# Patient Record
Sex: MALE | Race: WHITE | Employment: OTHER | URBAN - METROPOLITAN AREA
[De-identification: names, ages, dates, MRNs, and addresses within clinical notes are randomized per-mention and may not be internally consistent; named-entity substitution may affect disease eponyms.]

---

## 2020-04-28 ENCOUNTER — HOSPITAL ENCOUNTER (INPATIENT)
Age: 70
LOS: 1 days | Discharge: HOME | DRG: 192 | End: 2020-04-29
Attending: EMERGENCY MEDICINE | Admitting: INTERNAL MEDICINE
Payer: MEDICARE

## 2020-04-28 ENCOUNTER — APPOINTMENT (OUTPATIENT)
Dept: GENERAL RADIOLOGY | Age: 70
DRG: 192 | End: 2020-04-28
Payer: MEDICARE

## 2020-04-28 PROBLEM — J44.1 COPD EXACERBATION (HCC): Status: ACTIVE | Noted: 2020-04-28

## 2020-04-28 LAB
ALBUMIN SERPL-MCNC: 4.9 G/DL (ref 3.4–5)
ALP BLD-CCNC: 101 U/L (ref 40–129)
ALT SERPL-CCNC: 26 U/L (ref 10–40)
ANION GAP SERPL CALCULATED.3IONS-SCNC: 13 MMOL/L (ref 3–16)
AST SERPL-CCNC: 26 U/L (ref 15–37)
BASE EXCESS VENOUS: -0.2 MMOL/L (ref -2–3)
BASE EXCESS VENOUS: -0.5 MMOL/L (ref -2–3)
BASOPHILS ABSOLUTE: 0 K/UL (ref 0–0.2)
BASOPHILS RELATIVE PERCENT: 0.6 %
BILIRUB SERPL-MCNC: 0.3 MG/DL (ref 0–1)
BILIRUBIN DIRECT: <0.2 MG/DL (ref 0–0.3)
BILIRUBIN, INDIRECT: NORMAL MG/DL (ref 0–1)
BUN BLDV-MCNC: 19 MG/DL (ref 7–20)
C-REACTIVE PROTEIN: 2 MG/L (ref 0–5.1)
CALCIUM SERPL-MCNC: 9.8 MG/DL (ref 8.3–10.6)
CARBOXYHEMOGLOBIN: 1.1 % (ref 0–1.5)
CARBOXYHEMOGLOBIN: 1.5 % (ref 0–1.5)
CHLORIDE BLD-SCNC: 101 MMOL/L (ref 99–110)
CO2: 26 MMOL/L (ref 21–32)
CREAT SERPL-MCNC: 1.2 MG/DL (ref 0.8–1.3)
EKG ATRIAL RATE: 87 BPM
EKG DIAGNOSIS: NORMAL
EKG P AXIS: 52 DEGREES
EKG P-R INTERVAL: 172 MS
EKG Q-T INTERVAL: 404 MS
EKG QRS DURATION: 118 MS
EKG QTC CALCULATION (BAZETT): 526 MS
EKG R AXIS: -79 DEGREES
EKG T AXIS: 67 DEGREES
EKG VENTRICULAR RATE: 102 BPM
EOSINOPHILS ABSOLUTE: 0.2 K/UL (ref 0–0.6)
EOSINOPHILS RELATIVE PERCENT: 3.1 %
FERRITIN: 179.2 NG/ML (ref 30–400)
GFR AFRICAN AMERICAN: >60
GFR NON-AFRICAN AMERICAN: >60
GLUCOSE BLD-MCNC: 134 MG/DL (ref 70–99)
GLUCOSE BLD-MCNC: 169 MG/DL (ref 70–99)
GLUCOSE BLD-MCNC: 98 MG/DL (ref 70–99)
HCO3 VENOUS: 26.8 MMOL/L (ref 24–28)
HCO3 VENOUS: 27.3 MMOL/L (ref 24–28)
HCT VFR BLD CALC: 42.1 % (ref 40.5–52.5)
HEMOGLOBIN, VEN, REDUCED: 31.9 %
HEMOGLOBIN, VEN, REDUCED: 61.4 %
HEMOGLOBIN: 13.9 G/DL (ref 13.5–17.5)
LACTATE DEHYDROGENASE: 190 U/L (ref 100–190)
LACTIC ACID: 1.8 MMOL/L (ref 0.4–2)
LIPASE: 108 U/L (ref 13–60)
LYMPHOCYTES ABSOLUTE: 1.9 K/UL (ref 1–5.1)
LYMPHOCYTES RELATIVE PERCENT: 29.9 %
MCH RBC QN AUTO: 30.1 PG (ref 26–34)
MCHC RBC AUTO-ENTMCNC: 33.1 G/DL (ref 31–36)
MCV RBC AUTO: 91.1 FL (ref 80–100)
METHEMOGLOBIN VENOUS: 0.6 % (ref 0–1.5)
METHEMOGLOBIN VENOUS: 0.6 % (ref 0–1.5)
MONOCYTES ABSOLUTE: 0.4 K/UL (ref 0–1.3)
MONOCYTES RELATIVE PERCENT: 6 %
NEUTROPHILS ABSOLUTE: 3.9 K/UL (ref 1.7–7.7)
NEUTROPHILS RELATIVE PERCENT: 60.4 %
O2 SAT, VEN: 38 %
O2 SAT, VEN: 67 %
PCO2, VEN: 56.6 MMHG (ref 41–51)
PCO2, VEN: 61.4 MMHG (ref 41–51)
PDW BLD-RTO: 13.9 % (ref 12.4–15.4)
PERFORMED ON: ABNORMAL
PERFORMED ON: ABNORMAL
PH VENOUS: 7.27 (ref 7.35–7.45)
PH VENOUS: 7.3 (ref 7.35–7.45)
PLATELET # BLD: 197 K/UL (ref 135–450)
PMV BLD AUTO: 9 FL (ref 5–10.5)
PO2, VEN: 29.3 MMHG (ref 25–40)
PO2, VEN: 42.9 MMHG (ref 25–40)
POTASSIUM REFLEX MAGNESIUM: 4.7 MMOL/L (ref 3.5–5.1)
PRO-BNP: 79 PG/ML (ref 0–124)
PROCALCITONIN: 0.1 NG/ML (ref 0–0.15)
RAPID INFLUENZA  B AGN: NEGATIVE
RAPID INFLUENZA A AGN: NEGATIVE
RBC # BLD: 4.62 M/UL (ref 4.2–5.9)
SARS-COV-2, PCR: NOT DETECTED
SODIUM BLD-SCNC: 140 MMOL/L (ref 136–145)
TCO2 CALC VENOUS: 29 MMOL/L
TCO2 CALC VENOUS: 29 MMOL/L
TOTAL PROTEIN: 7.4 G/DL (ref 6.4–8.2)
TROPONIN: <0.01 NG/ML
WBC # BLD: 6.5 K/UL (ref 4–11)

## 2020-04-28 PROCEDURE — 6370000000 HC RX 637 (ALT 250 FOR IP): Performed by: INTERNAL MEDICINE

## 2020-04-28 PROCEDURE — 82728 ASSAY OF FERRITIN: CPT

## 2020-04-28 PROCEDURE — 0100U HC RESPIRPTHGN MULT REV TRANS & AMP PRB TECH 21 TRGT: CPT

## 2020-04-28 PROCEDURE — 85025 COMPLETE CBC W/AUTO DIFF WBC: CPT

## 2020-04-28 PROCEDURE — 71046 X-RAY EXAM CHEST 2 VIEWS: CPT

## 2020-04-28 PROCEDURE — 6370000000 HC RX 637 (ALT 250 FOR IP): Performed by: EMERGENCY MEDICINE

## 2020-04-28 PROCEDURE — 6360000002 HC RX W HCPCS

## 2020-04-28 PROCEDURE — 94669 MECHANICAL CHEST WALL OSCILL: CPT

## 2020-04-28 PROCEDURE — 96372 THER/PROPH/DIAG INJ SC/IM: CPT

## 2020-04-28 PROCEDURE — 83605 ASSAY OF LACTIC ACID: CPT

## 2020-04-28 PROCEDURE — 96374 THER/PROPH/DIAG INJ IV PUSH: CPT

## 2020-04-28 PROCEDURE — 84145 PROCALCITONIN (PCT): CPT

## 2020-04-28 PROCEDURE — 80048 BASIC METABOLIC PNL TOTAL CA: CPT

## 2020-04-28 PROCEDURE — 83615 LACTATE (LD) (LDH) ENZYME: CPT

## 2020-04-28 PROCEDURE — 96375 TX/PRO/DX INJ NEW DRUG ADDON: CPT

## 2020-04-28 PROCEDURE — 84484 ASSAY OF TROPONIN QUANT: CPT

## 2020-04-28 PROCEDURE — 94640 AIRWAY INHALATION TREATMENT: CPT

## 2020-04-28 PROCEDURE — 94150 VITAL CAPACITY TEST: CPT

## 2020-04-28 PROCEDURE — 87449 NOS EACH ORGANISM AG IA: CPT

## 2020-04-28 PROCEDURE — 80076 HEPATIC FUNCTION PANEL: CPT

## 2020-04-28 PROCEDURE — 86140 C-REACTIVE PROTEIN: CPT

## 2020-04-28 PROCEDURE — 2580000003 HC RX 258: Performed by: INTERNAL MEDICINE

## 2020-04-28 PROCEDURE — U0003 INFECTIOUS AGENT DETECTION BY NUCLEIC ACID (DNA OR RNA); SEVERE ACUTE RESPIRATORY SYNDROME CORONAVIRUS 2 (SARS-COV-2) (CORONAVIRUS DISEASE [COVID-19]), AMPLIFIED PROBE TECHNIQUE, MAKING USE OF HIGH THROUGHPUT TECHNOLOGIES AS DESCRIBED BY CMS-2020-01-R: HCPCS

## 2020-04-28 PROCEDURE — 87804 INFLUENZA ASSAY W/OPTIC: CPT

## 2020-04-28 PROCEDURE — 83880 ASSAY OF NATRIURETIC PEPTIDE: CPT

## 2020-04-28 PROCEDURE — 6360000002 HC RX W HCPCS: Performed by: EMERGENCY MEDICINE

## 2020-04-28 PROCEDURE — 6360000002 HC RX W HCPCS: Performed by: INTERNAL MEDICINE

## 2020-04-28 PROCEDURE — 2060000000 HC ICU INTERMEDIATE R&B

## 2020-04-28 PROCEDURE — 36415 COLL VENOUS BLD VENIPUNCTURE: CPT

## 2020-04-28 PROCEDURE — 89220 SPUTUM SPECIMEN COLLECTION: CPT

## 2020-04-28 PROCEDURE — 99285 EMERGENCY DEPT VISIT HI MDM: CPT

## 2020-04-28 PROCEDURE — 82803 BLOOD GASES ANY COMBINATION: CPT

## 2020-04-28 PROCEDURE — 83690 ASSAY OF LIPASE: CPT

## 2020-04-28 PROCEDURE — 93005 ELECTROCARDIOGRAM TRACING: CPT | Performed by: EMERGENCY MEDICINE

## 2020-04-28 RX ORDER — ALBUTEROL SULFATE 2.5 MG/3ML
SOLUTION RESPIRATORY (INHALATION)
Status: COMPLETED
Start: 2020-04-28 | End: 2020-04-28

## 2020-04-28 RX ORDER — ALBUTEROL SULFATE 2.5 MG/3ML
2.5 SOLUTION RESPIRATORY (INHALATION) ONCE
Status: COMPLETED | OUTPATIENT
Start: 2020-04-28 | End: 2020-04-28

## 2020-04-28 RX ORDER — NICOTINE POLACRILEX 4 MG
15 LOZENGE BUCCAL PRN
Status: DISCONTINUED | OUTPATIENT
Start: 2020-04-28 | End: 2020-04-29 | Stop reason: HOSPADM

## 2020-04-28 RX ORDER — METHYLPREDNISOLONE SODIUM SUCCINATE 40 MG/ML
40 INJECTION, POWDER, LYOPHILIZED, FOR SOLUTION INTRAMUSCULAR; INTRAVENOUS ONCE
Status: COMPLETED | OUTPATIENT
Start: 2020-04-28 | End: 2020-04-28

## 2020-04-28 RX ORDER — DEXTROSE MONOHYDRATE 25 G/50ML
12.5 INJECTION, SOLUTION INTRAVENOUS PRN
Status: DISCONTINUED | OUTPATIENT
Start: 2020-04-28 | End: 2020-04-29 | Stop reason: HOSPADM

## 2020-04-28 RX ORDER — PREDNISONE 20 MG/1
40 TABLET ORAL DAILY
Status: DISCONTINUED | OUTPATIENT
Start: 2020-04-28 | End: 2020-04-29 | Stop reason: HOSPADM

## 2020-04-28 RX ORDER — ALBUTEROL SULFATE 90 UG/1
2 AEROSOL, METERED RESPIRATORY (INHALATION) EVERY 6 HOURS PRN
COMMUNITY

## 2020-04-28 RX ORDER — IPRATROPIUM BROMIDE AND ALBUTEROL SULFATE 2.5; .5 MG/3ML; MG/3ML
1 SOLUTION RESPIRATORY (INHALATION) EVERY 4 HOURS
Status: DISCONTINUED | OUTPATIENT
Start: 2020-04-28 | End: 2020-04-29

## 2020-04-28 RX ORDER — BUDESONIDE AND FORMOTEROL FUMARATE DIHYDRATE 160; 4.5 UG/1; UG/1
2 AEROSOL RESPIRATORY (INHALATION) 2 TIMES DAILY
Status: DISCONTINUED | OUTPATIENT
Start: 2020-04-28 | End: 2020-04-29 | Stop reason: HOSPADM

## 2020-04-28 RX ORDER — FUROSEMIDE 10 MG/ML
20 INJECTION INTRAMUSCULAR; INTRAVENOUS ONCE
Status: COMPLETED | OUTPATIENT
Start: 2020-04-28 | End: 2020-04-28

## 2020-04-28 RX ORDER — PANTOPRAZOLE SODIUM 40 MG/1
40 TABLET, DELAYED RELEASE ORAL
Status: DISCONTINUED | OUTPATIENT
Start: 2020-04-28 | End: 2020-04-29 | Stop reason: HOSPADM

## 2020-04-28 RX ORDER — AMLODIPINE BESYLATE 5 MG/1
5 TABLET ORAL DAILY
Status: DISCONTINUED | OUTPATIENT
Start: 2020-04-28 | End: 2020-04-29

## 2020-04-28 RX ORDER — ALBUTEROL SULFATE 0.63 MG/3ML
1 SOLUTION RESPIRATORY (INHALATION) EVERY 6 HOURS PRN
Status: ON HOLD | COMMUNITY
End: 2020-04-28

## 2020-04-28 RX ORDER — INSULIN LISPRO 100 [IU]/ML
0-6 INJECTION, SOLUTION INTRAVENOUS; SUBCUTANEOUS
Status: DISCONTINUED | OUTPATIENT
Start: 2020-04-28 | End: 2020-04-29 | Stop reason: HOSPADM

## 2020-04-28 RX ORDER — METHYLPREDNISOLONE SODIUM SUCCINATE 40 MG/ML
40 INJECTION, POWDER, LYOPHILIZED, FOR SOLUTION INTRAMUSCULAR; INTRAVENOUS EVERY 12 HOURS
Status: DISCONTINUED | OUTPATIENT
Start: 2020-04-28 | End: 2020-04-28

## 2020-04-28 RX ORDER — POLYETHYLENE GLYCOL 3350 17 G/17G
17 POWDER, FOR SOLUTION ORAL DAILY PRN
Status: DISCONTINUED | OUTPATIENT
Start: 2020-04-28 | End: 2020-04-29 | Stop reason: HOSPADM

## 2020-04-28 RX ORDER — LISINOPRIL AND HYDROCHLOROTHIAZIDE 20; 12.5 MG/1; MG/1
1 TABLET ORAL 2 TIMES DAILY
Status: DISCONTINUED | OUTPATIENT
Start: 2020-04-28 | End: 2020-04-29

## 2020-04-28 RX ORDER — AMLODIPINE BESYLATE 5 MG/1
5 TABLET ORAL DAILY
COMMUNITY
End: 2021-12-14 | Stop reason: SINTOL

## 2020-04-28 RX ORDER — GUAIFENESIN 600 MG/1
600 TABLET, EXTENDED RELEASE ORAL 2 TIMES DAILY
Status: DISCONTINUED | OUTPATIENT
Start: 2020-04-28 | End: 2020-04-29 | Stop reason: HOSPADM

## 2020-04-28 RX ORDER — ALBUTEROL SULFATE 90 UG/1
2 AEROSOL, METERED RESPIRATORY (INHALATION) EVERY 6 HOURS PRN
Status: DISCONTINUED | OUTPATIENT
Start: 2020-04-28 | End: 2020-04-29

## 2020-04-28 RX ORDER — IPRATROPIUM BROMIDE AND ALBUTEROL SULFATE 2.5; .5 MG/3ML; MG/3ML
1 SOLUTION RESPIRATORY (INHALATION) ONCE
Status: COMPLETED | OUTPATIENT
Start: 2020-04-28 | End: 2020-04-28

## 2020-04-28 RX ORDER — ACETAMINOPHEN 650 MG/1
650 SUPPOSITORY RECTAL EVERY 6 HOURS PRN
Status: DISCONTINUED | OUTPATIENT
Start: 2020-04-28 | End: 2020-04-29 | Stop reason: HOSPADM

## 2020-04-28 RX ORDER — ACETAMINOPHEN 325 MG/1
650 TABLET ORAL EVERY 6 HOURS PRN
Status: DISCONTINUED | OUTPATIENT
Start: 2020-04-28 | End: 2020-04-29 | Stop reason: HOSPADM

## 2020-04-28 RX ORDER — SODIUM CHLORIDE 0.9 % (FLUSH) 0.9 %
10 SYRINGE (ML) INJECTION PRN
Status: DISCONTINUED | OUTPATIENT
Start: 2020-04-28 | End: 2020-04-29 | Stop reason: HOSPADM

## 2020-04-28 RX ORDER — SODIUM CHLORIDE 0.9 % (FLUSH) 0.9 %
10 SYRINGE (ML) INJECTION EVERY 12 HOURS SCHEDULED
Status: DISCONTINUED | OUTPATIENT
Start: 2020-04-28 | End: 2020-04-29 | Stop reason: HOSPADM

## 2020-04-28 RX ORDER — IPRATROPIUM BROMIDE AND ALBUTEROL SULFATE 2.5; .5 MG/3ML; MG/3ML
1 SOLUTION RESPIRATORY (INHALATION)
Status: DISCONTINUED | OUTPATIENT
Start: 2020-04-28 | End: 2020-04-28

## 2020-04-28 RX ORDER — LISINOPRIL AND HYDROCHLOROTHIAZIDE 20; 12.5 MG/1; MG/1
1 TABLET ORAL 2 TIMES DAILY
COMMUNITY
End: 2021-12-14

## 2020-04-28 RX ORDER — ONDANSETRON 2 MG/ML
4 INJECTION INTRAMUSCULAR; INTRAVENOUS EVERY 6 HOURS PRN
Status: DISCONTINUED | OUTPATIENT
Start: 2020-04-28 | End: 2020-04-29 | Stop reason: HOSPADM

## 2020-04-28 RX ORDER — LISINOPRIL 10 MG/1
12.5 TABLET ORAL 2 TIMES DAILY
Status: ON HOLD | COMMUNITY
End: 2020-04-28

## 2020-04-28 RX ORDER — PROMETHAZINE HYDROCHLORIDE 25 MG/1
12.5 TABLET ORAL EVERY 6 HOURS PRN
Status: DISCONTINUED | OUTPATIENT
Start: 2020-04-28 | End: 2020-04-29 | Stop reason: HOSPADM

## 2020-04-28 RX ORDER — DEXTROSE MONOHYDRATE 50 MG/ML
100 INJECTION, SOLUTION INTRAVENOUS PRN
Status: DISCONTINUED | OUTPATIENT
Start: 2020-04-28 | End: 2020-04-29 | Stop reason: HOSPADM

## 2020-04-28 RX ADMIN — GUAIFENESIN 600 MG: 600 TABLET, EXTENDED RELEASE ORAL at 19:31

## 2020-04-28 RX ADMIN — METHYLPREDNISOLONE SODIUM SUCCINATE 40 MG: 40 INJECTION, POWDER, FOR SOLUTION INTRAMUSCULAR; INTRAVENOUS at 06:27

## 2020-04-28 RX ADMIN — Medication 10 ML: at 19:31

## 2020-04-28 RX ADMIN — ALBUTEROL SULFATE 2.5 MG: 2.5 SOLUTION RESPIRATORY (INHALATION) at 09:59

## 2020-04-28 RX ADMIN — INSULIN LISPRO 1 UNITS: 100 INJECTION, SOLUTION INTRAVENOUS; SUBCUTANEOUS at 13:29

## 2020-04-28 RX ADMIN — AMLODIPINE BESYLATE 5 MG: 5 TABLET ORAL at 21:04

## 2020-04-28 RX ADMIN — IPRATROPIUM BROMIDE AND ALBUTEROL SULFATE 1 AMPULE: .5; 3 SOLUTION RESPIRATORY (INHALATION) at 15:00

## 2020-04-28 RX ADMIN — ALBUTEROL SULFATE 2.5 MG: 2.5 SOLUTION RESPIRATORY (INHALATION) at 09:58

## 2020-04-28 RX ADMIN — FUROSEMIDE 20 MG: 10 INJECTION, SOLUTION INTRAMUSCULAR; INTRAVENOUS at 16:33

## 2020-04-28 RX ADMIN — IPRATROPIUM BROMIDE AND ALBUTEROL SULFATE 1 AMPULE: .5; 3 SOLUTION RESPIRATORY (INHALATION) at 20:10

## 2020-04-28 RX ADMIN — IPRATROPIUM BROMIDE AND ALBUTEROL SULFATE 1 AMPULE: .5; 3 SOLUTION RESPIRATORY (INHALATION) at 06:15

## 2020-04-28 RX ADMIN — PREDNISONE 40 MG: 20 TABLET ORAL at 18:54

## 2020-04-28 RX ADMIN — ALBUTEROL SULFATE 2.5 MG: 2.5 SOLUTION RESPIRATORY (INHALATION) at 06:15

## 2020-04-28 RX ADMIN — LISINOPRIL AND HYDROCHLOROTHIAZIDE 1 TABLET: 12.5; 2 TABLET ORAL at 21:04

## 2020-04-28 RX ADMIN — ENOXAPARIN SODIUM 40 MG: 40 INJECTION SUBCUTANEOUS at 16:31

## 2020-04-28 RX ADMIN — PANTOPRAZOLE SODIUM 40 MG: 40 TABLET, DELAYED RELEASE ORAL at 13:29

## 2020-04-28 RX ADMIN — GUAIFENESIN 600 MG: 600 TABLET, EXTENDED RELEASE ORAL at 13:28

## 2020-04-28 RX ADMIN — BUDESONIDE AND FORMOTEROL FUMARATE DIHYDRATE 2 PUFF: 160; 4.5 AEROSOL RESPIRATORY (INHALATION) at 22:20

## 2020-04-28 SDOH — HEALTH STABILITY: MENTAL HEALTH: HOW OFTEN DO YOU HAVE A DRINK CONTAINING ALCOHOL?: NEVER

## 2020-04-28 ASSESSMENT — ENCOUNTER SYMPTOMS
VOMITING: 0
DIARRHEA: 1
EYE PAIN: 0
SHORTNESS OF BREATH: 1
COUGH: 0
NAUSEA: 0
WHEEZING: 0
ABDOMINAL PAIN: 0

## 2020-04-28 ASSESSMENT — PAIN SCALES - GENERAL: PAINLEVEL_OUTOF10: 0

## 2020-04-28 NOTE — H&P
Hospital Medicine History & Physical      PCP: Referring Not In System (Inactive)    Date of Admission: 4/28/2020    Date of Service: Pt seen/examined on 4/28/2020 and Admitted to Inpatient with expected LOS greater than two midnights due to medical therapy. Chief Complaint:  Shortness of breath, leg edema      History Of Present Illness: The patient is a 72 y.o. male with medical history of COPD and HTN, who presents to [facility] with worsening shortness of breath for the past few days. Patient reported initial dyspnea with laying down or exertion which progressed to resting dyspnea, prompting his admission. He denies any significant phlegm or cough. Has chronic cough but it is close to his baseline. Reports weight gain, abdominal distention and leg edema which is new for him. Subjective chills at home, but no documented temperature. No known sick contacts. Also reports some mid abdominal discomfort, nausea, few loose stools before admission, no emesis. ER course: patient was started on IV solumedrol and duonebs. CXR no infiltrate. Placed in COVID-19 rule out. He feels jittery on steroids. Past Medical History:        Diagnosis Date    COPD (chronic obstructive pulmonary disease) ([facility] [address]     Hypertension        Past Surgical History:    History reviewed. No pertinent surgical history. Medications Prior to Admission:    Prior to Admission medications    Not on File       Allergies:  Patient has no known allergies. Social History:  The patient currently lives at home    TOBACCO:   reports that he has quit smoking. He does not have any smokeless tobacco history on file. ETOH:   reports no history of alcohol use. Family History:  Reviewed in detail and negative for DM, Early CAD, Cancer, CVA. Positive as follows:    History reviewed. No pertinent family history. REVIEW OF SYSTEMS:   Positive and negative as noted in the HPI. All other systems reviewed and negative.     PHYSICAL EXAM:    BP (!) 142/80   Pulse 112   Temp 97.9 °F (36.6 °C) (Oral)   Resp 22   Ht 6' 2\" (1.88 m)   Wt 224 lb (101.6 kg)   SpO2 94%   BMI 28.76 kg/m²     General appearance: No apparent distress appears stated age and cooperative. HEENT Normal cephalic, atraumatic without obvious deformity. Conjunctivae/corneas clear. Neck: Supple, No jugular venous distention/bruits. Trachea midline without thyromegaly or adenopathy with full range of motion. Lungs: diminished without crackles or wheezing  Heart: Regular rate and rhythm with Normal S1/S2 without murmurs, rubs or gallops, point of maximum impulse non-displaced  Abdomen: Soft, non-tender or non-distended without rigidity or guarding and positive bowel sounds all four quadrants. Extremities: No clubbing, cyanosis, 1+ pitting leg edema bilaterally. Skin: Skin color, texture, turgor normal.    Neurologic: Alert and oriented X 3,  grossly non-focal.  Mental status: Alert, oriented, thought content appropriate. Capillary refill is brisk  Peripheral pulses 2+    CXR:  I have reviewed the CXR with the following interpretation: no infiltrates  EKG:  I have reviewed the EKG with the following interpretation: sinus with left axis and RBBB, no previous EKG available    CBC   Recent Labs     04/28/20  0556   WBC 6.5   HGB 13.9   HCT 42.1         RENAL  Recent Labs     04/28/20  0556      K 4.7      CO2 26   BUN 19   CREATININE 1.2     LFT'S  Recent Labs     04/28/20  0822   AST 26   ALT 26   BILIDIR <0.2   BILITOT 0.3   ALKPHOS 101     COAG  No results for input(s): INR in the last 72 hours.   CARDIAC ENZYMES  Recent Labs     04/28/20  0556   TROPONINI <0.01       U/A:  No results found for: NITRITE, COLORU, WBCUA, RBCUA, MUCUS, BACTERIA, CLARITYU, SPECGRAV, LEUKOCYTESUR, BLOODU, GLUCOSEU, AMORPHOUS    ABG  No results found for: WQF3YLQ, BEART, K1HPHEME, PHART, THGBART, KXH8GQR, PO2ART, XAI4FVL        Active Hospital Problems    Diagnosis Date

## 2020-04-28 NOTE — PROGRESS NOTES
4 Eyes Admission Assessment     I agree as the admission nurse that 2 RN's have performed a thorough Head to Toe Skin Assessment on the patient. ALL assessment sites listed below have been assessed on admission. Areas assessed by both nurses: ***  [x]   Head, Face, and Ears   [x]   Shoulders, Back, and Chest  [x]   Arms, Elbows, and Hands   [x]   Coccyx, Sacrum, and Ischum  [x]   Legs, Feet, and Heels        Does the Patient have Skin Breakdown?   No         Jace Prevention initiated:  No   Wound Care Orders initiated:  No      [facility] nurse consulted for Pressure Injury (Stage 3,4, Unstageable, DTI, NWPT, and Complex wounds):  No      Nurse 1 eSignature: Electronically signed by Archie Minor RN on 4/28/20 at 2:55 PM EDT    **SHARE this note so that the co-signing nurse is able to place an eSignature**    Nurse 2 eSignature: {Esignature:[id number]}

## 2020-04-28 NOTE — ED NOTES
Report given to [facility] on PCU. Pt will be transferred to PCU.       Leopoldo Castañeda RN  04/28/20 1047

## 2020-04-28 NOTE — CARE COORDINATION
Case Management Assessment           Initial Evaluation                Date / Time of Evaluation: 4/28/2020 10:56 AM                 Assessment Completed by: Aliza Costello    Patient Name: Griffin Brown     YOB: 1950  Diagnosis: COPD exacerbation ([facility] [address] [J44.1]  COPD exacerbation ([facility] [address] [J44.1]     Date / Time: 4/28/2020  5:36 AM    Patient Admission Status: Inpatient    If patient is discharged prior to next notation, then this note serves as note for discharge by case management. Current PCP: Referring Not In System (Inactive)  Clinic Patient: No    Chart Reviewed: Yes  Patient/ Family Interviewed: Yes    Initial assessment completed at bedside with: Patient    Hospitalization in the last 30 days: No    Emergency Contacts:  Extended Emergency Contact Information  Primary Emergency Contact: jaime agarwal Phone: [phone]  Relation: Child    Advance Directives:   Code Status: No Order    Healthcare Power of : No    Financial  Payor: /     Pre-cert required for SNF: No    Pharmacy  No Pharmacies Listed    Potential assistance Purchasing Medications:    Does Patient want to participate in local refill/ meds to beds program?:      Meds To Beds General Rules:  1. Can ONLY be done Monday- Friday between 8:30am-5pm  2. Prescription(s) must be in pharmacy by 3pm to be filled same day  3. Copy of patient's insurance/ prescription drug card and patient face sheet must be sent along with the prescription(s)  4. Cost of Rx cannot be added to hospital bill. If financial assistance is needed, please contact unit  or ;  or  CANNOT provide pharmacy voucher for patients co-pays  5.  Patients can then  the prescription on their way out of the hospital at discharge, or pharmacy can deliver to the bedside if staff is available. (payment due at time of pick-up or delivery - cash, check, or card accepted)     Able to afford home [phone]

## 2020-04-28 NOTE — PROGRESS NOTES
RESPIRATORY THERAPY ASSESSMENT    Name:  Jonnie Abel  Medical Record Number:  [id number]  Age: 72 y.o. Gender: male  : 1950  Today's Date:  2020  Room:  [facility]4322-01    Assessment     Is the patient being admitted for a COPD or Asthma exacerbation? Yes   (If yes the patient will be seen every 4 hours for the first 24 hours and then reassessed)    Patient Admission Diagnosis      Allergies  No Known Allergies    Minimum Predicted Vital Capacity:    1750          Actual Vital Capacity:      2250              Pulmonary History:COPD  Home Oxygen Therapy:  room air  Home Respiratory Therapy:None   Current Respiratory Therapy: HHN Duoneb Q4 hrs  Treatment Type: HHN, IS, Vibratory mucous clearing therapy or intervention  Medications: Albuterol/Ipratropium    Respiratory Severity Index(RSI)   Patients with orders for inhalation medications, oxygen, or any therapeutic treatment modality will be placed on Respiratory Protocol. They will be assessed with the first treatment and at least every 72 hours thereafter. The following severity scale will be used to determine frequency of treatment intervention. Smoking History: Mild Exacerbation = 3    Social History  Social History     Tobacco Use    Smoking status: Former Smoker   Substance Use Topics    Alcohol use: Never     Frequency: Never    Drug use: Never       Recent Surgical History: None = 0  Past Surgical History  History reviewed. No pertinent surgical history.     Level of Consciousness: Alert, Oriented, and Cooperative = 0    Level of Activity: Walking unassisted = 0    Respiratory Pattern: Increased; RR 21-30 = 1    Breath Sounds: Diminshed bilaterally and/or crackles = 2    Sputum   ,  , Sputum How Obtained: Spontaneous cough  Cough: Strong, spontaneous, non-productive = 0    Vital Signs   BP (!) 142/80   Pulse 112   Temp 97.9 °F (36.6 °C) (Oral)   Resp 22   Ht 6' 2\" (1.88 m)   Wt 224 lb (101.6 kg)   SpO2 94%   BMI 28.76 kg/m² SPO2 (COPD values may differ): Greater than or equal to 92% on room air = 0    Peak Flow (asthma only): not applicable = 0    RSI: 7-8 = BID and Q4HPRN (every four hours as needed) for dyspnea        Plan       Goals: medication delivery, mobilize retained secretions, volume expansion and improve oxygenation    Patient/caregiver was educated on the proper method of use for Respiratory Care Devices:  Yes      Level of patient/caregiver understanding able to:   ? Verbalize understanding   ? Demonstrate understanding       ? Teach back        ? Needs reinforcement       ? No available caregiver               ? Other:     Response to education:  Excellent     Is patient being placed on Home Treatment Regimen? No     Does the patient have everything they need prior to discharge? NA     Comments: Continue HHN Duoneb Q4hrs and prn. Plan of Care:     Electronically signed by Sherice Acosta RCP on 4/28/2020 at 3:16 PM    Respiratory Protocol Guidelines     1. Assessment and treatment by Respiratory Therapy will be initiated for medication and therapeutic interventions upon initiation of aerosolized medication. 2. Physician will be contacted for respiratory rate (RR) greater than 35 breaths per minute. Therapy will be held for heart rate (HR) greater than 140 beats per minute, pending direction from physician. 3. Bronchodilators will be administered via Metered Dose Inhaler (MDI) with spacer when the following criteria are met:  a. Alert and cooperative     b. HR < 140 bpm  c. RR < 30 bpm                d. Can demonstrate a 2-3 second inspiratory hold  4. Bronchodilators will be administered via Hand Held Nebulizer SHANTEL [facility]) to patients when ANY of the following criteria are met  a. Incognizant or uncooperative          b. Patients treated with HHN at Home        c. Unable to demonstrate proper use of MDI with spacer     d. RR > 30 bpm   5.  Bronchodilators will be delivered via Metered Dose Inhaler (MDI), HHN, Aerogen to intubated patients on mechanical ventilation. 6. Inhalation medication orders will be delivered and/or substituted as outlined below. Aerosolized Medications Ordering and Administration Guidelines:    1. All Medications will be ordered by a physician, and their frequency and/or modality will be adjusted as defined by the patients Respiratory Severity Index (RSI) score. 2. If the patient does not have documented COPD, consider discontinuing anticholinergics when RSI is less than 9.  3. If the bronchospasm worsens (increased RSI), then the bronchodilator frequency can be increased to a maximum of every 4 hours. If greater than every 4 hours is required, the physician will be contacted. 4. If the bronchospasm improves, the frequency of the bronchodilator can be decreased, based on the patient's RSI, but not less than home treatment regimen frequency. 5. Bronchodilator(s) will be discontinued if patient has a RSI less than 9 and has received no scheduled or as needed treatment for 72  Hrs. Patients Ordered on a Mucolytic Agent:    1. Must always be administered with a bronchodilator. 2. Discontinue if patient experiences worsened bronchospasm, or secretions have lessened to the point that the patient is able to clear them with a cough. Anti-inflammatory and Combination Medications:    1. If the patient lacks prior history of lung disease, is not using inhaled anti-inflammatory medication at home, and lacks wheezing by examination or by history for at least 24 hours, contact physician for possible discontinuation.

## 2020-04-28 NOTE — ED PROVIDER NOTES
[address] ENCOUNTER          ATTENDING PHYSICIAN NOTE       Date of evaluation: 4/28/2020    Chief Complaint     Shortness of Breath      History of Present Illness     Amari Lee is a 72 y.o. male who presents with a chief complaint shortness of breath. The patient has a history of COPD. States that he woke from sleep last night with a discomfort that he locates as being absent the xiphoid process and shortness of breath. He states that he went to the bathroom and had diarrhea and the discomfort got better but the shortness of breath persisted and so he called 911. He denies any cough. No chest pain at this time. No nausea or vomiting. Apparently he was 85% on room air for EMS which improved with supplemental oxygen. Review of Systems     Review of Systems   Constitutional: Negative for chills and fever. HENT: Negative for congestion. Eyes: Negative for pain. Respiratory: Positive for shortness of breath. Negative for cough and wheezing. Cardiovascular: Positive for chest pain. Negative for leg swelling. Gastrointestinal: Positive for diarrhea. Negative for abdominal pain, nausea and vomiting. Genitourinary: Negative for dysuria. Musculoskeletal: Negative for arthralgias. Skin: Negative for rash. Neurological: Negative for weakness and headaches. All other systems reviewed and are negative. Past Medical, Surgical, Family, and Social History         Diagnosis Date    COPD (chronic obstructive pulmonary disease) ([facility] [address]     Hypertension      History reviewed. No pertinent surgical history. His family history is not on file. He reports that he has quit smoking. He does not have any smokeless tobacco history on file. He reports that he does not drink alcohol or use drugs. Medications     Previous Medications    No medications on file       Allergies     He has No Known Allergies.     Physical Exam     ED Triage Vitals [04/28/20 0543]   Enc Vitals Group      BP (!) 131/98      Pulse 93      Resp 20      Temp 97.6 °F (36.4 °C)      Temp Source Oral      SpO2 100 %      Weight       Height       Head Circumference       Peak Flow       Pain Score       Pain Loc       Pain Edu? Excl. in [address]? General:  Non-toxic, no acute distress, fully cooperative with my exam    HEENT:  NCAT    Neck:  Supple    Pulmonary:   No increased work of breathing; faint scattered wheezes    Cardiac:  RRR, no murmur, thrill or gallop. Capillary refill <3s. 2+ distal pulses    Abdomen:  Soft, nontender, nondistended; no focal rebound or guarding    Musculoskeletal:  Grossly intact without obvious injury or deformity    Neuro:  No focal motor deficits    Skin: No rashes      Diagnostic Results     EKG   Sinus rhythm. Left axis deviation. Prolonged QT. No obvious ST or T wave changes. RADIOLOGY:  XR CHEST STANDARD (2 VW)   Final Result      Minimal bibasilar linear opacity, likely atelectasis or scarring.           LABS:   Results for orders placed or performed during the hospital encounter of 04/28/20   CBC Auto Differential   Result Value Ref Range    WBC 6.5 4.0 - 11.0 K/uL    RBC 4.62 4.20 - 5.90 M/uL    Hemoglobin 13.9 13.5 - 17.5 g/dL    Hematocrit 42.1 40.5 - 52.5 %    MCV 91.1 80.0 - 100.0 fL    MCH 30.1 26.0 - 34.0 pg    MCHC 33.1 31.0 - 36.0 g/dL    RDW 13.9 12.4 - 15.4 %    Platelets 802 468 - 669 K/uL    MPV 9.0 5.0 - 10.5 fL    Neutrophils % 60.4 %    Lymphocytes % 29.9 %    Monocytes % 6.0 %    Eosinophils % 3.1 %    Basophils % 0.6 %    Neutrophils Absolute 3.9 1.7 - 7.7 K/uL    Lymphocytes Absolute 1.9 1.0 - 5.1 K/uL    Monocytes Absolute 0.4 0.0 - 1.3 K/uL    Eosinophils Absolute 0.2 0.0 - 0.6 K/uL    Basophils Absolute 0.0 0.0 - 0.2 K/uL   EKG 12 Lead   Result Value Ref Range    Ventricular Rate 102 BPM    Atrial Rate 87 BPM    P-R Interval 172 ms    QRS Duration 118 ms    Q-T Interval 404 ms    QTc Calculation (Bazett) 526 ms    P Axis 52 degrees Carl Edmonds MD  04/28/20 5047

## 2020-04-29 VITALS
DIASTOLIC BLOOD PRESSURE: 73 MMHG | RESPIRATION RATE: 18 BRPM | HEIGHT: 74 IN | WEIGHT: 224 LBS | SYSTOLIC BLOOD PRESSURE: 126 MMHG | TEMPERATURE: 98 F | OXYGEN SATURATION: 93 % | HEART RATE: 104 BPM | BODY MASS INDEX: 28.75 KG/M2

## 2020-04-29 LAB
ALBUMIN SERPL-MCNC: 4.7 G/DL (ref 3.4–5)
ALP BLD-CCNC: 97 U/L (ref 40–129)
ALT SERPL-CCNC: 24 U/L (ref 10–40)
ANION GAP SERPL CALCULATED.3IONS-SCNC: 14 MMOL/L (ref 3–16)
AST SERPL-CCNC: 31 U/L (ref 15–37)
BILIRUB SERPL-MCNC: 0.4 MG/DL (ref 0–1)
BILIRUBIN DIRECT: <0.2 MG/DL (ref 0–0.3)
BILIRUBIN, INDIRECT: NORMAL MG/DL (ref 0–1)
BUN BLDV-MCNC: 23 MG/DL (ref 7–20)
CALCIUM SERPL-MCNC: 9.6 MG/DL (ref 8.3–10.6)
CHLORIDE BLD-SCNC: 98 MMOL/L (ref 99–110)
CO2: 23 MMOL/L (ref 21–32)
CREAT SERPL-MCNC: 1.3 MG/DL (ref 0.8–1.3)
D DIMER: <200 NG/ML DDU (ref 0–229)
GFR AFRICAN AMERICAN: >60
GFR NON-AFRICAN AMERICAN: 55
GLUCOSE BLD-MCNC: 112 MG/DL (ref 70–99)
GLUCOSE BLD-MCNC: 123 MG/DL (ref 70–99)
GLUCOSE BLD-MCNC: 133 MG/DL (ref 70–99)
GLUCOSE BLD-MCNC: 158 MG/DL (ref 70–99)
L. PNEUMOPHILA SEROGP 1 UR AG: NORMAL
LIPASE: 24 U/L (ref 13–60)
LV EF: 58 %
LVEF MODALITY: NORMAL
PERFORMED ON: ABNORMAL
POTASSIUM REFLEX MAGNESIUM: 4.8 MMOL/L (ref 3.5–5.1)
REPORT: NORMAL
RESPIRATORY PANEL PCR: NORMAL
SODIUM BLD-SCNC: 135 MMOL/L (ref 136–145)
STREP PNEUMONIAE ANTIGEN, URINE: NORMAL
TOTAL PROTEIN: 7 G/DL (ref 6.4–8.2)

## 2020-04-29 PROCEDURE — 83690 ASSAY OF LIPASE: CPT

## 2020-04-29 PROCEDURE — C8929 TTE W OR WO FOL WCON,DOPPLER: HCPCS

## 2020-04-29 PROCEDURE — 85379 FIBRIN DEGRADATION QUANT: CPT

## 2020-04-29 PROCEDURE — 36415 COLL VENOUS BLD VENIPUNCTURE: CPT

## 2020-04-29 PROCEDURE — 6360000002 HC RX W HCPCS: Performed by: INTERNAL MEDICINE

## 2020-04-29 PROCEDURE — 6370000000 HC RX 637 (ALT 250 FOR IP): Performed by: INTERNAL MEDICINE

## 2020-04-29 PROCEDURE — 80076 HEPATIC FUNCTION PANEL: CPT

## 2020-04-29 PROCEDURE — 94640 AIRWAY INHALATION TREATMENT: CPT

## 2020-04-29 PROCEDURE — 6360000004 HC RX CONTRAST MEDICATION: Performed by: INTERNAL MEDICINE

## 2020-04-29 PROCEDURE — 2580000003 HC RX 258: Performed by: INTERNAL MEDICINE

## 2020-04-29 PROCEDURE — 80048 BASIC METABOLIC PNL TOTAL CA: CPT

## 2020-04-29 PROCEDURE — 96372 THER/PROPH/DIAG INJ SC/IM: CPT

## 2020-04-29 PROCEDURE — 99223 1ST HOSP IP/OBS HIGH 75: CPT | Performed by: INTERNAL MEDICINE

## 2020-04-29 RX ORDER — PREDNISONE 20 MG/1
40 TABLET ORAL DAILY
Qty: 10 TABLET | Refills: 0 | Status: SHIPPED | OUTPATIENT
Start: 2020-04-30 | End: 2020-05-05

## 2020-04-29 RX ORDER — AMLODIPINE BESYLATE 5 MG/1
5 TABLET ORAL DAILY
Status: DISCONTINUED | OUTPATIENT
Start: 2020-04-29 | End: 2020-04-29 | Stop reason: HOSPADM

## 2020-04-29 RX ORDER — ATORVASTATIN CALCIUM 20 MG/1
20 TABLET, FILM COATED ORAL NIGHTLY
Status: DISCONTINUED | OUTPATIENT
Start: 2020-04-29 | End: 2020-04-29 | Stop reason: HOSPADM

## 2020-04-29 RX ORDER — ATORVASTATIN CALCIUM 20 MG/1
20 TABLET, FILM COATED ORAL NIGHTLY
Qty: 30 TABLET | Refills: 3 | Status: SHIPPED | OUTPATIENT
Start: 2020-04-29

## 2020-04-29 RX ORDER — IPRATROPIUM BROMIDE AND ALBUTEROL SULFATE 2.5; .5 MG/3ML; MG/3ML
1 SOLUTION RESPIRATORY (INHALATION)
Status: DISCONTINUED | OUTPATIENT
Start: 2020-04-29 | End: 2020-04-29 | Stop reason: HOSPADM

## 2020-04-29 RX ORDER — LISINOPRIL 5 MG/1
5 TABLET ORAL DAILY
Status: DISCONTINUED | OUTPATIENT
Start: 2020-04-29 | End: 2020-04-29 | Stop reason: HOSPADM

## 2020-04-29 RX ORDER — ALBUTEROL SULFATE 2.5 MG/3ML
2.5 SOLUTION RESPIRATORY (INHALATION) EVERY 6 HOURS PRN
Status: DISCONTINUED | OUTPATIENT
Start: 2020-04-29 | End: 2020-04-29 | Stop reason: HOSPADM

## 2020-04-29 RX ORDER — ALBUTEROL SULFATE 2.5 MG/3ML
2.5 SOLUTION RESPIRATORY (INHALATION) EVERY 6 HOURS PRN
Qty: 120 EACH | Refills: 3 | Status: SHIPPED | OUTPATIENT
Start: 2020-04-29

## 2020-04-29 RX ADMIN — BUDESONIDE AND FORMOTEROL FUMARATE DIHYDRATE 2 PUFF: 160; 4.5 AEROSOL RESPIRATORY (INHALATION) at 12:40

## 2020-04-29 RX ADMIN — PERFLUTREN 1.65 MG: 6.52 INJECTION, SUSPENSION INTRAVENOUS at 09:06

## 2020-04-29 RX ADMIN — AMLODIPINE BESYLATE 5 MG: 5 TABLET ORAL at 09:46

## 2020-04-29 RX ADMIN — IPRATROPIUM BROMIDE AND ALBUTEROL SULFATE 1 AMPULE: .5; 3 SOLUTION RESPIRATORY (INHALATION) at 12:39

## 2020-04-29 RX ADMIN — Medication 10 ML: at 09:47

## 2020-04-29 RX ADMIN — PREDNISONE 40 MG: 20 TABLET ORAL at 09:46

## 2020-04-29 RX ADMIN — IPRATROPIUM BROMIDE AND ALBUTEROL SULFATE 1 AMPULE: .5; 3 SOLUTION RESPIRATORY (INHALATION) at 00:20

## 2020-04-29 RX ADMIN — PANTOPRAZOLE SODIUM 40 MG: 40 TABLET, DELAYED RELEASE ORAL at 09:46

## 2020-04-29 RX ADMIN — IPRATROPIUM BROMIDE AND ALBUTEROL SULFATE 1 AMPULE: .5; 3 SOLUTION RESPIRATORY (INHALATION) at 04:55

## 2020-04-29 RX ADMIN — IPRATROPIUM BROMIDE AND ALBUTEROL SULFATE 1 AMPULE: .5; 3 SOLUTION RESPIRATORY (INHALATION) at 16:00

## 2020-04-29 RX ADMIN — GUAIFENESIN 600 MG: 600 TABLET, EXTENDED RELEASE ORAL at 09:46

## 2020-04-29 RX ADMIN — ENOXAPARIN SODIUM 40 MG: 40 INJECTION SUBCUTANEOUS at 09:46

## 2020-04-29 ASSESSMENT — PAIN SCALES - GENERAL
PAINLEVEL_OUTOF10: 0

## 2020-04-29 NOTE — PROGRESS NOTES
kg/m²   SPO2 (COPD values may differ): Greater than or equal to 92% on room air = 0    Peak Flow (asthma only): not applicable = 0    RSI: 0-4 = See once and convert to home regimen or discontinue        Plan       Goals: medication delivery    Patient/caregiver was educated on the proper method of use for Respiratory Care Devices:  Yes      Level of patient/caregiver understanding able to:   ? Verbalize understanding   ? Demonstrate understanding       ? Teach back        ? Needs reinforcement       ? No available caregiver               ? Other:     Response to education:  Good     Is patient being placed on Home Treatment Regimen? No     Does the patient have everything they need prior to discharge? NA     Comments: Pt assessed to q4hr prn, but leaving q4hr w/a    Plan of Care: Adrien Alcantar q4hr w/a    Electronically signed by Ryan Taylor RCP on 4/29/2020 at 2:32 PM    Respiratory Protocol Guidelines     1. Assessment and treatment by Respiratory Therapy will be initiated for medication and therapeutic interventions upon initiation of aerosolized medication. 2. Physician will be contacted for respiratory rate (RR) greater than 35 breaths per minute. Therapy will be held for heart rate (HR) greater than 140 beats per minute, pending direction from physician. 3. Bronchodilators will be administered via Metered Dose Inhaler (MDI) with spacer when the following criteria are met:  a. Alert and cooperative     b. HR < 140 bpm  c. RR < 30 bpm                d. Can demonstrate a 2-3 second inspiratory hold  4. Bronchodilators will be administered via Hand Held Nebulizer SHANTEL [facility]) to patients when ANY of the following criteria are met  a. Incognizant or uncooperative          b. Patients treated with HHN at Home        c. Unable to demonstrate proper use of MDI with spacer     d. RR > 30 bpm   5.  Bronchodilators will be delivered via Metered Dose Inhaler (MDI), HHN, Aerogen to intubated patients on mechanical No

## 2020-04-29 NOTE — PROGRESS NOTES
distress, appears stated age and cooperative. Lungs: diminished without wheezing or crackles  Heart: Regular rate and rhythm with Normal S1/S2 without  murmurs, rubs or gallops, point of maximum impulse non-displaced  Abdomen: Soft, non-tender or non-distended without rigidity or guarding and positive bowel sounds all four quadrants. Extremities: No clubbing, cyanosis, improved edema of LE bilaterally. Skin: Skin color, texture, turgor normal.    Neurologic: Alert and oriented X 3,  grossly non-focal.  Mental status: Alert, oriented, thought content appropriate. Data    Recent Labs     04/28/20  0556   WBC 6.5   HGB 13.9   HCT 42.1         Recent Labs     04/28/20  0556 04/29/20  0510    135*   K 4.7 4.8    98*   CO2 26 23   BUN 19 23*   CREATININE 1.2 1.3     Recent Labs     04/28/20  0822 04/29/20  0510   AST 26 31   ALT 26 24   BILIDIR <0.2 <0.2   BILITOT 0.3 0.4   ALKPHOS 101 97     No results for input(s): INR in the last 72 hours. Recent Labs     04/28/20  0556   TROPONINI <0.01       Consults:     IP CONSULT TO HOSPITALIST  IP CONSULT TO CARDIOLOGY    Active Hospital Problems    Diagnosis Date Noted    COPD exacerbation ([facility] [address] [J44.1] 04/28/2020         ASSESSMENT AND PLAN      Dyspnea:  COPD with exacerbation, cannot exclude CHF given leg edema and orthopnea. Continue with steroids and bronchodilators   Hold further lasix due to bump in creatinine and will ask cardiology to evaluate  Continue with ICS/LABA  Infectious work up- COVID-19, rapid flu, resp film panel- all negative  Given prolonged QT- holding off azithromycin   IS and acapella  ECHO - nl EF, with abnormal septal motion    Dyspepsia:  Mild, had mildly elevated lipase but otherwise benign abdomen. All liver enzymes are normal today- diet advanced. HTN:  Resume norvasc, hold lisinopril for now    No Acute Respiratory Failure     DVT Prophylaxis: lovenox  Diet: DIET CARDIAC;   Diet NPO, After Midnight  Code

## 2020-04-29 NOTE — CONSULTS
72 y.o. woman who came to hospital with sob and LE edema. Pt has had increasing sob over the last several days. Has had increased LE edema, weight gain, and abd distension. Had some chills at home. No obvious fevers but didn't check temp at home (no fevers here). He says this sob feeling is just like his \"usual\" copd exacerbations, and he already feels better with his treatment. He actually (despite his tachypnea) actually said he feels better than he did 1 mo ago, but he is more \"amped\" up because of the steroids. He has no angina. Never had an MI that he knows of. His sob laying flat is better than when he came in.    LE edema is down. Abd swelling seems to be better. Past Medical History:   Diagnosis Date    COPD (chronic obstructive pulmonary disease) ([facility] [address]     Hypertension      History reviewed. No pertinent surgical history. Social History     Tobacco Use    Smoking status: Former Smoker   Substance Use Topics    Alcohol use: Never     Frequency: Never    Drug use: Never     No Known Allergies    History reviewed. No pertinent family history. Review of Systems   General: No fevers, chills, fatigue, or night sweats. No abnormal changes in weight. HEENT: No blurry or decreased vision. No changes in hearing, nasal discharge or sore throat. Cardiovascular: See HPI. No cramping in legs or buttocks when walking. Respiratory: No cough, hemoptysis, or wheezing. No history of asthma. Gastrointestinal: No abdominal pain, hematochezia, melana, or history of GI ulcers. Genito-Urinary: No dysuria or hematuria. No urgency or polyuria. Musculoskeletal: No complaints of joint pain, joint swelling or muscular weakness/soreness. Neurological: No dizziness or headaches. No numbness/tingling, speech problems or weakness. No history of a stroke or TIA. Psychological: No anxiety or depression  Hematological and Lymphatic: No abnormal bleeding or bruising, blood clots, jaundice. Endocrine: No malaise/lethargy, palpitations, polydipsia/polyuria, temperature intolerance or unexpected weight changes. Skin: No rashes or non-healing ulcers. PE:  Blood pressure (!) 173/74, pulse 102, temperature 97.7 °F (36.5 °C), temperature source Oral, resp. rate 14, height 6' 2\" (1.88 m), weight 224 lb (101.6 kg), SpO2 95 %. General (appearance): Well developed. Tachypnic. Eyes: Anicteric. EOMI. Neck: Supple. No JVD  Ears/Nose/Mouth/Thorat: No cyanosis  CV: Regular tachy. Diminished  Respiratory: Diminished. Tachypnic. ? Upper airway wheeze? GI: Abd soft  Skin: Warm, dry. No rashes  Neuro/Psych: Alert and oriented x 3. Appropriate behavior  Ext:  No c/c. Minimal edema  Pulses:  2+ carotid    CBC:   Recent Labs     04/28/20  0556   WBC 6.5   HGB 13.9   HCT 42.1   MCV 91.1        BMP:   Recent Labs     04/28/20  0556 04/29/20  0510    135*   K 4.7 4.8    98*   CO2 26 23   BUN 19 23*   CREATININE 1.2 1.3     Mag: No results found for: MG  LIVER PROFILE:   Recent Labs     04/28/20  0822 04/29/20  0510   AST 26 31   ALT 26 24   LIPASE 108.0* 24.0   BILIDIR <0.2 <0.2   BILITOT 0.3 0.4   ALKPHOS 101 97     CARDIAC ENZYMES:   Recent Labs     04/28/20  0556   TROPONINI <0.01     COVID negative    CXR: Minimal bibasilar linear opacities, poss atelec/scarring  4/29/2020 TTE: EF 55-60%. Abnormal septal motion poss related to IVCD. Mild TR with RVSP of 33 mm Hg.    4/2020 EKG: NSR, ivcd/poss rbbb    4/29/2020 Pro-BNP 79.     D-Dimer negative      Current Facility-Administered Medications:     amLODIPine (NORVASC) tablet 5 mg, 5 mg, Oral, Daily, Franca Garvin MD, 5 mg at 04/29/20 0946    acetaminophen (TYLENOL) tablet 650 mg, 650 mg, Oral, Q6H PRN **OR** acetaminophen (TYLENOL) suppository 650 mg, 650 mg, Rectal, Q6H PRN, Franca Garvin MD    sodium chloride flush 0.9 % injection 10 mL, 10 mL, Intravenous, 2 times per day, Franca Garvin MD, 10 mL at 04/29/20 0988    sodium chloride flush

## 2020-04-29 NOTE — DISCHARGE SUMMARY
Hospital Medicine Discharge Summary      Patient ID: Konrad White      Patient's PCP: Kelley Lobato MD    Admit Date: 4/28/2020     Discharge Date:  4/29/2020    Admitting Physician: Temi Pringle MD    Discharge Physician: Temi Pringle MD     Discharge Diagnoses: Active Hospital Problems    Diagnosis Date Noted    Abnormal ECG [R94.31]     MCGUIRE (dyspnea on exertion) [R06.09]     COPD exacerbation (HCC) [J44.1] 04/28/2020         No Acute Respiratory Failure       The patient was seen and examined on day of discharge and this discharge summary is in conjunction with any daily progress note from day of discharge. Hospital Course:     Patient was admitted from home with dyspnea. Treated for COPD exacerbation with possible CHF. CHF ruled out. COVID-19 ruled out. Improved with bronchodilators and steroids. Seen by cardiology- advised on outpatient stress test. Discharged home in stable condition with outpatient follow up. Consults:     IP CONSULT TO HOSPITALIST  IP CONSULT TO CARDIOLOGY    Disposition: Home . Discharged Condition: Stable    Code Status: Full Code    Activity: activity as tolerated    Diet: cardiac diet    Follow Up: Primary Care Physician in one week    Exam:     General appearance: No apparent distress, appears stated age and cooperative. Lungs: Clear to ascultation, bilaterally without Rales/Wheezes/Rhonchi with good respiratory effort. Heart: Regular rate and rhythm with Normal S1/S2 without  murmurs, rubs or gallops, point of maximum impulse non-displaced  Abdomen: Soft, non-tender or non-distended without rigidity or guarding and positive bowel sounds all four quadrants. Extremities: No clubbing, cyanosis, or edema bilaterally. Skin: Skin color, texture, turgor normal.    Neurologic: Alert and oriented X 3,  grossly non-focal.  Mental status: Alert, oriented, thought content appropriate      Labs:  For convenience and continuity at follow-up the following

## 2020-04-30 ENCOUNTER — CARE COORDINATION (OUTPATIENT)
Dept: CASE MANAGEMENT | Age: 70
End: 2020-04-30

## 2020-04-30 NOTE — CARE COORDINATION
[facility] Transitions Initial Follow Up Call    Call within 2 business days of discharge: Yes    Patient:  Sharif Mane   Patient :  1950  MRN:  [id number]     Reason for Admission: COVID-19 Monitoring   Discharge Date:  20    RARS:  Jasiel      Non-face-to-face services provided:    1ST CTC attempt to reach Pt regarding recent hospital discharge. CTC left voice recording with call back number requesting a call back. Follow up appointments:    No future appointments.     Thank Jd Gross RN  Care Transition Coordinator  [facility]:[phone]
before eating or preparing food. If soap and water are not readily available, use an alcohol-based hand  with at least 60% alcohol, covering all surfaces of your hands and rubbing them together until they feel dry. Soap and water are the best option if hands are visibly dirty. Avoid touching your eyes, nose, and mouth with unwashed hands. Avoid sharing personal household items  You should not share dishes, drinking glasses, cups, eating utensils, towels, or bedding with other people or pets in your home. After using these items, they should be washed thoroughly with soap and water. Clean all high-touch surfaces everyday  High touch surfaces include counters, tabletops, doorknobs, bathroom fixtures, toilets, phones, keyboards, tablets, and bedside tables. Also, clean any surfaces that may have blood, stool, or body fluids on them. Use a household cleaning spray or wipe, according to the label instructions. Labels contain instructions for safe and effective use of the cleaning product including precautions you should take when applying the product, such as wearing gloves and making sure you have good ventilation during use of the product. Monitor your symptoms  Seek prompt medical attention if your illness is worsening (e.g., difficulty breathing). Before seeking care, call your healthcare provider and tell them that you have, or are being evaluated for, COVID-19. Put on a facemask before you enter the facility. These steps will help the healthcare providers office to keep other people in the office or waiting room from getting infected or exposed. Ask your healthcare provider to call the local or state health department. Persons who are placed under active monitoring or facilitated self-monitoring should follow instructions provided by their local health department or occupational health professionals, as appropriate. When working with your local health department check their available hours.   If you

## 2020-05-07 ENCOUNTER — CARE COORDINATION (OUTPATIENT)
Dept: CASE MANAGEMENT | Age: 70
End: 2020-05-07

## 2020-05-07 NOTE — CARE COORDINATION
You Patient resolved from the Care Transitions episode on 05/06/2020  Patient/family has been provided the following resources and education related to COVID-19:                         Signs, symptoms and red flags related to COVID-19            CDC exposure and quarantine guidelines            [facility] exposure contact - [phone]            Contact for their local Department of Health                 Patient currently reports that the following symptoms have improved:  no new/worsening symptoms     No further outreach scheduled with this CTN/ACM. Episode of Care resolved. Patient has this CTN/ACM contact information if future needs arise.     Thank Becca Jimenez RN  Care Transition Coordinator  Contact [facility]:[phone]

## 2020-09-07 ENCOUNTER — APPOINTMENT (OUTPATIENT)
Dept: GENERAL RADIOLOGY | Age: 70
End: 2020-09-07
Payer: MEDICARE

## 2020-09-07 ENCOUNTER — HOSPITAL ENCOUNTER (EMERGENCY)
Age: 70
Discharge: HOME | End: 2020-09-07
Attending: EMERGENCY MEDICINE
Payer: MEDICARE

## 2020-09-07 VITALS
RESPIRATION RATE: 18 BRPM | OXYGEN SATURATION: 87 % | DIASTOLIC BLOOD PRESSURE: 74 MMHG | SYSTOLIC BLOOD PRESSURE: 118 MMHG | HEART RATE: 69 BPM | TEMPERATURE: 98.7 F

## 2020-09-07 LAB
A/G RATIO: 1.8 (ref 1.1–2.2)
ALBUMIN SERPL-MCNC: 4.8 G/DL (ref 3.4–5)
ALP BLD-CCNC: 113 U/L (ref 40–129)
ALT SERPL-CCNC: 20 U/L (ref 10–40)
ANION GAP SERPL CALCULATED.3IONS-SCNC: 11 MMOL/L (ref 3–16)
AST SERPL-CCNC: 24 U/L (ref 15–37)
BASE EXCESS VENOUS: 2 MMOL/L (ref -2–3)
BASOPHILS ABSOLUTE: 0 K/UL (ref 0–0.2)
BASOPHILS RELATIVE PERCENT: 0.6 %
BILIRUB SERPL-MCNC: 0.3 MG/DL (ref 0–1)
BUN BLDV-MCNC: 20 MG/DL (ref 7–20)
CALCIUM SERPL-MCNC: 9.3 MG/DL (ref 8.3–10.6)
CARBOXYHEMOGLOBIN: 1.4 % (ref 0–1.5)
CHLORIDE BLD-SCNC: 103 MMOL/L (ref 99–110)
CO2: 27 MMOL/L (ref 21–32)
CREAT SERPL-MCNC: 1.4 MG/DL (ref 0.8–1.3)
EKG ATRIAL RATE: 82 BPM
EKG DIAGNOSIS: NORMAL
EKG P AXIS: 87 DEGREES
EKG P-R INTERVAL: 190 MS
EKG Q-T INTERVAL: 382 MS
EKG QRS DURATION: 146 MS
EKG QTC CALCULATION (BAZETT): 446 MS
EKG R AXIS: -82 DEGREES
EKG T AXIS: 57 DEGREES
EKG VENTRICULAR RATE: 82 BPM
EOSINOPHILS ABSOLUTE: 0.2 K/UL (ref 0–0.6)
EOSINOPHILS RELATIVE PERCENT: 3.2 %
GFR AFRICAN AMERICAN: >60
GFR NON-AFRICAN AMERICAN: 50
GLOBULIN: 2.6 G/DL
GLUCOSE BLD-MCNC: 87 MG/DL (ref 70–99)
HCO3 VENOUS: 28.9 MMOL/L (ref 24–28)
HCT VFR BLD CALC: 39.9 % (ref 40.5–52.5)
HEMOGLOBIN, VEN, REDUCED: 49.4 %
HEMOGLOBIN: 13.3 G/DL (ref 13.5–17.5)
LIPASE: 125 U/L (ref 13–60)
LYMPHOCYTES ABSOLUTE: 1.9 K/UL (ref 1–5.1)
LYMPHOCYTES RELATIVE PERCENT: 32.3 %
MCH RBC QN AUTO: 29.7 PG (ref 26–34)
MCHC RBC AUTO-ENTMCNC: 33.3 G/DL (ref 31–36)
MCV RBC AUTO: 89.2 FL (ref 80–100)
METHEMOGLOBIN VENOUS: 0.6 % (ref 0–1.5)
MONOCYTES ABSOLUTE: 0.4 K/UL (ref 0–1.3)
MONOCYTES RELATIVE PERCENT: 6.7 %
NEUTROPHILS ABSOLUTE: 3.3 K/UL (ref 1.7–7.7)
NEUTROPHILS RELATIVE PERCENT: 57.2 %
O2 SAT, VEN: 50 %
PCO2, VEN: 57.5 MMHG (ref 41–51)
PDW BLD-RTO: 13.4 % (ref 12.4–15.4)
PH VENOUS: 7.32 (ref 7.35–7.45)
PLATELET # BLD: 175 K/UL (ref 135–450)
PMV BLD AUTO: 8.9 FL (ref 5–10.5)
PO2, VEN: 31.9 MMHG (ref 25–40)
POTASSIUM REFLEX MAGNESIUM: 3.9 MMOL/L (ref 3.5–5.1)
PRO-BNP: 107 PG/ML (ref 0–124)
RBC # BLD: 4.48 M/UL (ref 4.2–5.9)
SODIUM BLD-SCNC: 141 MMOL/L (ref 136–145)
TCO2 CALC VENOUS: 31 MMOL/L
TOTAL PROTEIN: 7.4 G/DL (ref 6.4–8.2)
TROPONIN: <0.01 NG/ML
TROPONIN: <0.01 NG/ML
WBC # BLD: 5.8 K/UL (ref 4–11)

## 2020-09-07 PROCEDURE — 99285 EMERGENCY DEPT VISIT HI MDM: CPT

## 2020-09-07 PROCEDURE — 83690 ASSAY OF LIPASE: CPT

## 2020-09-07 PROCEDURE — 85025 COMPLETE CBC W/AUTO DIFF WBC: CPT

## 2020-09-07 PROCEDURE — 83880 ASSAY OF NATRIURETIC PEPTIDE: CPT

## 2020-09-07 PROCEDURE — 82803 BLOOD GASES ANY COMBINATION: CPT

## 2020-09-07 PROCEDURE — 80053 COMPREHEN METABOLIC PANEL: CPT

## 2020-09-07 PROCEDURE — 71046 X-RAY EXAM CHEST 2 VIEWS: CPT

## 2020-09-07 PROCEDURE — 84484 ASSAY OF TROPONIN QUANT: CPT

## 2020-09-07 PROCEDURE — 93005 ELECTROCARDIOGRAM TRACING: CPT | Performed by: EMERGENCY MEDICINE

## 2020-09-07 NOTE — ED PROVIDER NOTES
[address] [facility]          ATTENDING PHYSICIAN NOTE       Date of evaluation: 9/7/2020    Chief Complaint     Shortness of Breath      History of Present Illness     Blanca Sneed is a 72 y.o. male who presents with complaint of shortness of breath for about the last 5 days to week. Says is a little bit increased from his baseline. He is really not having much increase in cough. Says he felt a little more short of breath this morning early in the morning, as he was getting ready to play golf, but played a full round and felt better as time went on, improving throughout the day. No chest pain. No recent immobilization. No extremity swelling that he can think of. No shortness of breath when he lies down. He says that the main reason he came in tonight was that he was also concerned that when he laid down his whole body felt hot. He does not describe it as a burning, just a feeling of extreme warmth all through his chest rating down to his bilateral legs. He resolved immediately on sitting up. Reports he had a similar episode 2 days ago. Not associate with nausea, shortness of breath, or chest pain. Does not feel this way now. Does not feel short of breath now. No fever. Review of Systems     Review of Systems  Pertinent positives and negatives are listed in HPI; otherwise all systems are reviewed and were negative  Past Medical, Surgical, Family, and Social History     He has a past medical history of COPD (chronic obstructive pulmonary disease) ([address] and Hypertension. He has no past surgical history on file. His family history is not on file. He reports that he has quit smoking. He does not have any smokeless tobacco history on file. He reports that he does not drink alcohol or use drugs.     Medications     Previous Medications    ALBUTEROL (PROVENTIL) (2.5 MG/3ML) 0.083% NEBULIZER SOLUTION    Take 3 mLs by nebulization every 6 hours as needed for Wheezing Morphology grossly similar to previous EKGs. RADIOLOGY:  XR CHEST (2 VW)   Final Result     No acute cardiopulmonary process.               LABS:   Results for orders placed or performed during the hospital encounter of 09/07/20   CBC Auto Differential   Result Value Ref Range    WBC 5.8 4.0 - 11.0 K/uL    RBC 4.48 4.20 - 5.90 M/uL    Hemoglobin 13.3 (L) 13.5 - 17.5 g/dL    Hematocrit 39.9 (L) 40.5 - 52.5 %    MCV 89.2 80.0 - 100.0 fL    MCH 29.7 26.0 - 34.0 pg    MCHC 33.3 31.0 - 36.0 g/dL    RDW 13.4 12.4 - 15.4 %    Platelets 038 701 - 346 K/uL    MPV 8.9 5.0 - 10.5 fL    Neutrophils % 57.2 %    Lymphocytes % 32.3 %    Monocytes % 6.7 %    Eosinophils % 3.2 %    Basophils % 0.6 %    Neutrophils Absolute 3.3 1.7 - 7.7 K/uL    Lymphocytes Absolute 1.9 1.0 - 5.1 K/uL    Monocytes Absolute 0.4 0.0 - 1.3 K/uL    Eosinophils Absolute 0.2 0.0 - 0.6 K/uL    Basophils Absolute 0.0 0.0 - 0.2 K/uL   Comprehensive Metabolic Panel w/ Reflex to MG   Result Value Ref Range    Sodium 141 136 - 145 mmol/L    Potassium reflex Magnesium 3.9 3.5 - 5.1 mmol/L    Chloride 103 99 - 110 mmol/L    CO2 27 21 - 32 mmol/L    Anion Gap 11 3 - 16    Glucose 87 70 - 99 mg/dL    BUN 20 7 - 20 mg/dL    CREATININE 1.4 (H) 0.8 - 1.3 mg/dL    GFR Non-African American 50 (A) >60    GFR African American >60 >60    Calcium 9.3 8.3 - 10.6 mg/dL    Total Protein 7.4 6.4 - 8.2 g/dL    Alb 4.8 3.4 - 5.0 g/dL    Albumin/Globulin Ratio 1.8 1.1 - 2.2    Total Bilirubin 0.3 0.0 - 1.0 mg/dL    Alkaline Phosphatase 113 40 - 129 U/L    ALT 20 10 - 40 U/L    AST 24 15 - 37 U/L    Globulin 2.6 g/dL   Lipase   Result Value Ref Range    Lipase 125.0 (H) 13.0 - 60.0 U/L   Troponin   Result Value Ref Range    Troponin <0.01 <0.01 ng/mL   Brain Natriuretic Peptide   Result Value Ref Range    Pro- 0 - 124 pg/mL   Blood Gas, Venous   Result Value Ref Range    pH, Chuck 7.322 (L) 7.350 - 7.450    pCO2, Chuck 57.5 (H) 41.0 - 51.0 mmHg    pO2, Chuck 31.9 25.0 - 40.0 mmHg HCO3, Venous 28.9 (H) 24.0 - 28.0 mmol/L    Base Excess, Chuck 2.0 -2.0 - 3.0 mmol/L    O2 Sat, Chuck 50 Not established %    Carboxyhemoglobin 1.4 0.0 - 1.5 %    MetHgb, Chuck 0.6 0.0 - 1.5 %    TC02 (Calc), Chuck 31 mmol/L    Hemoglobin, Chuck, Reduced 49.40 %   Troponin   Result Value Ref Range    Troponin <0.01 <0.01 ng/mL       ED BEDSIDE ULTRASOUND:      RECENT VITALS:  BP: 121/78,Temp: 98.7 °F (37.1 °C), Pulse: 79, Resp: 18, SpO2: 93 %     Procedures         ED Course     Nursing Notes, Past Medical Hx, Past Surgical Hx, Social Hx,Allergies, and Family Hx were reviewed. patient was given the following medications:  No orders of the defined types were placed in this encounter. CONSULTS:  None    MEDICAL DECISIONMAKING / ASSESSMENT / PLAN     Tesfaye Elizabeth is a 72 y.o. male who presents with some shortness of breath intermittently over the last week, and some earlier in the day, but this was actually improved with some physical activity, and a feeling of warmth through his chest when he laid down this evening was not associated with dyspnea, chest pain, nausea, or abdominal pain. His lab work-up is largely unremarkable with negative troponin x2, really only remarkable for a mild leukocytosis and a slightly elevated lipase at 125. The patient has no abdominal pain, no nausea, no tenderness in his abdomen, is able to take p.o. without difficulty and has been throughout the day. He denies any alcohol use. Addie these findings with him and will refer him for follow-up with his PCP. He otherwise looks well. He says he is not feeling short of breath, and is not hypoxic or tachycardic or in respiratory distress and is able to ambulate without difficulty. He has pretty minimal wheezing, says that he does not feel like he needs a breathing treatment and has nebs at home if needed. Overall patient looks well, and is essentially symptom-free here. Clinical Impression     1.  Shortness of breath Disposition     PATIENT REFERRED TO:  No follow-up provider specified.     DISCHARGE MEDICATIONS:  New Prescriptions    No medications on file       DISPOSITION Decision To Discharge 09/07/2020 06:28:55 AM         Opal Swann MD  09/07/20 [id number]

## 2020-09-08 ENCOUNTER — CARE COORDINATION (OUTPATIENT)
Dept: CARE COORDINATION | Age: 70
End: 2020-09-08

## 2020-09-08 NOTE — CARE COORDINATION
Patient contacted regarding Phylicia Haile. Discussed COVID-19 related testing which was not done at this time. Test results were not done. Patient informed of results, if available? na    Care Transition Nurse/ Ambulatory Care Manager contacted the patient by telephone to perform post discharge assessment. Call within 2 business days of discharge: Yes. Verified name and  with patient as identifiers. Provided introduction to self, and explanation of the CTN/ACM role, and reason for call due to risk factors for infection and/or exposure to COVID-19. Symptoms reviewed with patient who verbalized the following symptoms: no new symptoms and no worsening symptoms. Due to no new or worsening symptoms encounter was not routed to provider for escalation. Discussed follow-up appointments. If no appointment was previously scheduled, appointment scheduling offered: [facility] follow up appointment(s): No future appointments. Non-[facility] follow up appointment(s): na    Non-face-to-face services provided:  Scheduled appointment with PCP-plans to call     Advance Care Planning:   Does patient have an Advance Directive:  not on file; education provided. Patient has following risk factors of: COPD. CTN/ACM reviewed discharge instructions, medical action plan and red flags such as increased shortness of breath, increasing fever and signs of decompensation with patient who verbalized understanding. Discussed exposure protocols and quarantine with CDC Guidelines What to do if you are sick with coronavirus disease .  Patient was given an opportunity for questions and concerns. The patient agrees to contact the [facility] exposure line [phone], local [facility] department [place] Department of Health: ([phone]) and PCP office for questions related to their healthcare. CTN/ACM provided contact information for future needs.     Reviewed and educated patient on any new and changed medications related to discharge diagnosis     Patient/family/caregiver given information for [facility] Loop and agrees to enroll no  Patient's preferred e-mail: na   Patient's preferred phone number: na  Based on Loop alert triggers, patient will be contacted by nurse care manager for worsening symptoms. Plan for follow-up call in 5-7 days based on severity of symptoms and risk factors. Reports is feeling better.

## 2020-09-23 ENCOUNTER — CARE COORDINATION (OUTPATIENT)
Dept: CARE COORDINATION | Age: 70
End: 2020-09-23

## 2020-10-02 ENCOUNTER — CARE COORDINATION (OUTPATIENT)
Dept: CARE COORDINATION | Age: 70
End: 2020-10-02

## 2021-03-06 ENCOUNTER — HOSPITAL ENCOUNTER (INPATIENT)
Age: 71
LOS: 4 days | Discharge: HOME | DRG: 190 | End: 2021-03-10
Attending: STUDENT IN AN ORGANIZED HEALTH CARE EDUCATION/TRAINING PROGRAM | Admitting: INTERNAL MEDICINE
Payer: MEDICARE

## 2021-03-06 ENCOUNTER — APPOINTMENT (OUTPATIENT)
Dept: GENERAL RADIOLOGY | Age: 71
DRG: 190 | End: 2021-03-06
Payer: MEDICARE

## 2021-03-06 DIAGNOSIS — J44.1 COPD EXACERBATION (HCC): Primary | ICD-10-CM

## 2021-03-06 DIAGNOSIS — N17.9 AKI (ACUTE KIDNEY INJURY) (HCC): ICD-10-CM

## 2021-03-06 DIAGNOSIS — R09.02 HYPOXIA: ICD-10-CM

## 2021-03-06 LAB
A/G RATIO: 1.8 (ref 1.1–2.2)
ALBUMIN SERPL-MCNC: 4.8 G/DL (ref 3.4–5)
ALP BLD-CCNC: 149 U/L (ref 40–129)
ALT SERPL-CCNC: 24 U/L (ref 10–40)
ANION GAP SERPL CALCULATED.3IONS-SCNC: 11 MMOL/L (ref 3–16)
AST SERPL-CCNC: 27 U/L (ref 15–37)
BASOPHILS ABSOLUTE: 0 K/UL (ref 0–0.2)
BASOPHILS RELATIVE PERCENT: 0.5 %
BILIRUB SERPL-MCNC: 0.5 MG/DL (ref 0–1)
BUN BLDV-MCNC: 19 MG/DL (ref 7–20)
CALCIUM SERPL-MCNC: 9.4 MG/DL (ref 8.3–10.6)
CHLORIDE BLD-SCNC: 99 MMOL/L (ref 99–110)
CO2: 23 MMOL/L (ref 21–32)
CREAT SERPL-MCNC: 1.2 MG/DL (ref 0.8–1.3)
EKG ATRIAL RATE: 98 BPM
EKG DIAGNOSIS: NORMAL
EKG P AXIS: 85 DEGREES
EKG P-R INTERVAL: 196 MS
EKG Q-T INTERVAL: 370 MS
EKG QRS DURATION: 146 MS
EKG QTC CALCULATION (BAZETT): 472 MS
EKG R AXIS: 270 DEGREES
EKG T AXIS: 77 DEGREES
EKG VENTRICULAR RATE: 98 BPM
EOSINOPHILS ABSOLUTE: 0.1 K/UL (ref 0–0.6)
EOSINOPHILS RELATIVE PERCENT: 1.3 %
GFR AFRICAN AMERICAN: >60
GFR NON-AFRICAN AMERICAN: 60
GLOBULIN: 2.7 G/DL
GLUCOSE BLD-MCNC: 103 MG/DL (ref 70–99)
HCT VFR BLD CALC: 40.5 % (ref 40.5–52.5)
HEMOGLOBIN: 13.2 G/DL (ref 13.5–17.5)
LYMPHOCYTES ABSOLUTE: 1 K/UL (ref 1–5.1)
LYMPHOCYTES RELATIVE PERCENT: 13.3 %
MCH RBC QN AUTO: 29.3 PG (ref 26–34)
MCHC RBC AUTO-ENTMCNC: 32.7 G/DL (ref 31–36)
MCV RBC AUTO: 89.5 FL (ref 80–100)
MONOCYTES ABSOLUTE: 0.4 K/UL (ref 0–1.3)
MONOCYTES RELATIVE PERCENT: 4.7 %
NEUTROPHILS ABSOLUTE: 6.3 K/UL (ref 1.7–7.7)
NEUTROPHILS RELATIVE PERCENT: 80.2 %
PDW BLD-RTO: 13.5 % (ref 12.4–15.4)
PLATELET # BLD: 203 K/UL (ref 135–450)
PMV BLD AUTO: 8.8 FL (ref 5–10.5)
POTASSIUM REFLEX MAGNESIUM: 4.7 MMOL/L (ref 3.5–5.1)
PRO-BNP: 113 PG/ML (ref 0–124)
RBC # BLD: 4.53 M/UL (ref 4.2–5.9)
SODIUM BLD-SCNC: 133 MMOL/L (ref 136–145)
TOTAL PROTEIN: 7.5 G/DL (ref 6.4–8.2)
TROPONIN: <0.01 NG/ML
WBC # BLD: 7.8 K/UL (ref 4–11)

## 2021-03-06 PROCEDURE — 83880 ASSAY OF NATRIURETIC PEPTIDE: CPT

## 2021-03-06 PROCEDURE — 6370000000 HC RX 637 (ALT 250 FOR IP): Performed by: INTERNAL MEDICINE

## 2021-03-06 PROCEDURE — 94640 AIRWAY INHALATION TREATMENT: CPT

## 2021-03-06 PROCEDURE — 2060000000 HC ICU INTERMEDIATE R&B

## 2021-03-06 PROCEDURE — 85025 COMPLETE CBC W/AUTO DIFF WBC: CPT

## 2021-03-06 PROCEDURE — 6360000002 HC RX W HCPCS: Performed by: INTERNAL MEDICINE

## 2021-03-06 PROCEDURE — 93005 ELECTROCARDIOGRAM TRACING: CPT | Performed by: STUDENT IN AN ORGANIZED HEALTH CARE EDUCATION/TRAINING PROGRAM

## 2021-03-06 PROCEDURE — 99284 EMERGENCY DEPT VISIT MOD MDM: CPT

## 2021-03-06 PROCEDURE — 6360000002 HC RX W HCPCS: Performed by: STUDENT IN AN ORGANIZED HEALTH CARE EDUCATION/TRAINING PROGRAM

## 2021-03-06 PROCEDURE — 80053 COMPREHEN METABOLIC PANEL: CPT

## 2021-03-06 PROCEDURE — 84484 ASSAY OF TROPONIN QUANT: CPT

## 2021-03-06 PROCEDURE — 6370000000 HC RX 637 (ALT 250 FOR IP): Performed by: STUDENT IN AN ORGANIZED HEALTH CARE EDUCATION/TRAINING PROGRAM

## 2021-03-06 PROCEDURE — 96374 THER/PROPH/DIAG INJ IV PUSH: CPT

## 2021-03-06 PROCEDURE — U0003 INFECTIOUS AGENT DETECTION BY NUCLEIC ACID (DNA OR RNA); SEVERE ACUTE RESPIRATORY SYNDROME CORONAVIRUS 2 (SARS-COV-2) (CORONAVIRUS DISEASE [COVID-19]), AMPLIFIED PROBE TECHNIQUE, MAKING USE OF HIGH THROUGHPUT TECHNOLOGIES AS DESCRIBED BY CMS-2020-01-R: HCPCS

## 2021-03-06 PROCEDURE — 71045 X-RAY EXAM CHEST 1 VIEW: CPT

## 2021-03-06 PROCEDURE — 2580000003 HC RX 258: Performed by: INTERNAL MEDICINE

## 2021-03-06 RX ORDER — ALBUTEROL SULFATE 2.5 MG/3ML
SOLUTION RESPIRATORY (INHALATION)
COMMUNITY

## 2021-03-06 RX ORDER — ACETAMINOPHEN 325 MG/1
650 TABLET ORAL EVERY 6 HOURS PRN
Status: DISCONTINUED | OUTPATIENT
Start: 2021-03-06 | End: 2021-03-10 | Stop reason: HOSPADM

## 2021-03-06 RX ORDER — LISINOPRIL AND HYDROCHLOROTHIAZIDE 20; 12.5 MG/1; MG/1
1 TABLET ORAL DAILY
Status: DISCONTINUED | OUTPATIENT
Start: 2021-03-07 | End: 2021-03-10 | Stop reason: HOSPADM

## 2021-03-06 RX ORDER — ACETAMINOPHEN 650 MG/1
650 SUPPOSITORY RECTAL EVERY 6 HOURS PRN
Status: DISCONTINUED | OUTPATIENT
Start: 2021-03-06 | End: 2021-03-10 | Stop reason: HOSPADM

## 2021-03-06 RX ORDER — FUROSEMIDE 10 MG/ML
40 INJECTION INTRAMUSCULAR; INTRAVENOUS ONCE
Status: COMPLETED | OUTPATIENT
Start: 2021-03-06 | End: 2021-03-06

## 2021-03-06 RX ORDER — ALBUTEROL SULFATE 90 UG/1
2 AEROSOL, METERED RESPIRATORY (INHALATION) EVERY 4 HOURS PRN
COMMUNITY
Start: 2021-02-26

## 2021-03-06 RX ORDER — CETIRIZINE HYDROCHLORIDE 10 MG/1
10 TABLET ORAL DAILY
COMMUNITY

## 2021-03-06 RX ORDER — PREDNISONE 20 MG/1
40 TABLET ORAL DAILY
Status: DISCONTINUED | OUTPATIENT
Start: 2021-03-09 | End: 2021-03-08

## 2021-03-06 RX ORDER — ONDANSETRON 2 MG/ML
4 INJECTION INTRAMUSCULAR; INTRAVENOUS EVERY 6 HOURS PRN
Status: DISCONTINUED | OUTPATIENT
Start: 2021-03-06 | End: 2021-03-10 | Stop reason: HOSPADM

## 2021-03-06 RX ORDER — ATORVASTATIN CALCIUM 20 MG/1
20 TABLET, FILM COATED ORAL NIGHTLY
Status: DISCONTINUED | OUTPATIENT
Start: 2021-03-06 | End: 2021-03-10 | Stop reason: HOSPADM

## 2021-03-06 RX ORDER — AZITHROMYCIN 250 MG/1
250 TABLET, FILM COATED ORAL DAILY
Status: COMPLETED | OUTPATIENT
Start: 2021-03-07 | End: 2021-03-10

## 2021-03-06 RX ORDER — LISINOPRIL AND HYDROCHLOROTHIAZIDE 20; 12.5 MG/1; MG/1
1 TABLET ORAL DAILY
Status: ON HOLD | COMMUNITY
End: 2021-03-10 | Stop reason: HOSPADM

## 2021-03-06 RX ORDER — CETIRIZINE HYDROCHLORIDE 10 MG/1
10 TABLET ORAL DAILY
Status: DISCONTINUED | OUTPATIENT
Start: 2021-03-06 | End: 2021-03-10 | Stop reason: HOSPADM

## 2021-03-06 RX ORDER — IPRATROPIUM BROMIDE AND ALBUTEROL SULFATE 2.5; .5 MG/3ML; MG/3ML
1 SOLUTION RESPIRATORY (INHALATION)
Status: DISCONTINUED | OUTPATIENT
Start: 2021-03-06 | End: 2021-03-06 | Stop reason: ALTCHOICE

## 2021-03-06 RX ORDER — AZITHROMYCIN 250 MG/1
500 TABLET, FILM COATED ORAL DAILY
Status: COMPLETED | OUTPATIENT
Start: 2021-03-06 | End: 2021-03-06

## 2021-03-06 RX ORDER — POLYETHYLENE GLYCOL 3350 17 G/17G
17 POWDER, FOR SOLUTION ORAL DAILY PRN
Status: DISCONTINUED | OUTPATIENT
Start: 2021-03-06 | End: 2021-03-10 | Stop reason: HOSPADM

## 2021-03-06 RX ORDER — NAPROXEN SODIUM 220 MG
220 TABLET ORAL 2 TIMES DAILY PRN
Status: ON HOLD | COMMUNITY
End: 2021-03-10 | Stop reason: HOSPADM

## 2021-03-06 RX ORDER — AMLODIPINE BESYLATE 5 MG/1
5 TABLET ORAL DAILY
Status: DISCONTINUED | OUTPATIENT
Start: 2021-03-06 | End: 2021-03-09

## 2021-03-06 RX ORDER — AMLODIPINE BESYLATE 5 MG/1
5 TABLET ORAL DAILY
Status: ON HOLD | COMMUNITY
End: 2021-03-10 | Stop reason: HOSPADM

## 2021-03-06 RX ORDER — SODIUM CHLORIDE 0.9 % (FLUSH) 0.9 %
10 SYRINGE (ML) INJECTION PRN
Status: DISCONTINUED | OUTPATIENT
Start: 2021-03-06 | End: 2021-03-10 | Stop reason: HOSPADM

## 2021-03-06 RX ORDER — ATORVASTATIN CALCIUM 20 MG/1
20 TABLET, FILM COATED ORAL DAILY
COMMUNITY

## 2021-03-06 RX ORDER — LISINOPRIL 5 MG/1
5 TABLET ORAL DAILY
Status: DISCONTINUED | OUTPATIENT
Start: 2021-03-07 | End: 2021-03-10 | Stop reason: HOSPADM

## 2021-03-06 RX ORDER — PROMETHAZINE HYDROCHLORIDE 25 MG/1
12.5 TABLET ORAL EVERY 6 HOURS PRN
Status: DISCONTINUED | OUTPATIENT
Start: 2021-03-06 | End: 2021-03-10 | Stop reason: HOSPADM

## 2021-03-06 RX ORDER — IPRATROPIUM BROMIDE AND ALBUTEROL SULFATE 2.5; .5 MG/3ML; MG/3ML
1 SOLUTION RESPIRATORY (INHALATION)
Status: DISPENSED | OUTPATIENT
Start: 2021-03-06 | End: 2021-03-06

## 2021-03-06 RX ORDER — DIPHENHYDRAMINE HCL 25 MG
25 TABLET ORAL EVERY OTHER DAY
COMMUNITY

## 2021-03-06 RX ORDER — SODIUM CHLORIDE 0.9 % (FLUSH) 0.9 %
10 SYRINGE (ML) INJECTION EVERY 12 HOURS SCHEDULED
Status: DISCONTINUED | OUTPATIENT
Start: 2021-03-06 | End: 2021-03-10 | Stop reason: HOSPADM

## 2021-03-06 RX ORDER — METHYLPREDNISOLONE SODIUM SUCCINATE 125 MG/2ML
125 INJECTION, POWDER, LYOPHILIZED, FOR SOLUTION INTRAMUSCULAR; INTRAVENOUS ONCE
Status: COMPLETED | OUTPATIENT
Start: 2021-03-06 | End: 2021-03-06

## 2021-03-06 RX ORDER — METHYLPREDNISOLONE SODIUM SUCCINATE 40 MG/ML
40 INJECTION, POWDER, LYOPHILIZED, FOR SOLUTION INTRAMUSCULAR; INTRAVENOUS EVERY 6 HOURS
Status: COMPLETED | OUTPATIENT
Start: 2021-03-06 | End: 2021-03-08

## 2021-03-06 RX ORDER — LANOLIN ALCOHOL/MO/W.PET/CERES
6 CREAM (GRAM) TOPICAL NIGHTLY PRN
Status: DISCONTINUED | OUTPATIENT
Start: 2021-03-06 | End: 2021-03-10 | Stop reason: HOSPADM

## 2021-03-06 RX ADMIN — METHYLPREDNISOLONE SODIUM SUCCINATE 40 MG: 40 INJECTION, POWDER, FOR SOLUTION INTRAMUSCULAR; INTRAVENOUS at 22:10

## 2021-03-06 RX ADMIN — ATORVASTATIN CALCIUM 20 MG: 20 TABLET, FILM COATED ORAL at 20:26

## 2021-03-06 RX ADMIN — CETIRIZINE HYDROCHLORIDE 10 MG: 10 TABLET, FILM COATED ORAL at 22:10

## 2021-03-06 RX ADMIN — METHYLPREDNISOLONE SODIUM SUCCINATE 125 MG: 125 INJECTION, POWDER, FOR SOLUTION INTRAMUSCULAR; INTRAVENOUS at 08:56

## 2021-03-06 RX ADMIN — Medication 6 MG: at 22:50

## 2021-03-06 RX ADMIN — ENOXAPARIN SODIUM 40 MG: 40 INJECTION SUBCUTANEOUS at 16:27

## 2021-03-06 RX ADMIN — IPRATROPIUM BROMIDE AND ALBUTEROL SULFATE 1 AMPULE: .5; 3 SOLUTION RESPIRATORY (INHALATION) at 09:13

## 2021-03-06 RX ADMIN — AZITHROMYCIN MONOHYDRATE 500 MG: 250 TABLET ORAL at 16:28

## 2021-03-06 RX ADMIN — Medication 10 ML: at 22:10

## 2021-03-06 RX ADMIN — FUROSEMIDE 40 MG: 10 INJECTION, SOLUTION INTRAMUSCULAR; INTRAVENOUS at 14:32

## 2021-03-06 RX ADMIN — METHYLPREDNISOLONE SODIUM SUCCINATE 40 MG: 40 INJECTION, POWDER, FOR SOLUTION INTRAMUSCULAR; INTRAVENOUS at 16:28

## 2021-03-06 RX ADMIN — IPRATROPIUM BROMIDE AND ALBUTEROL 1 PUFF: 20; 100 SPRAY, METERED RESPIRATORY (INHALATION) at 20:26

## 2021-03-06 ASSESSMENT — ENCOUNTER SYMPTOMS
BACK PAIN: 0
SORE THROAT: 0
DIARRHEA: 0
SHORTNESS OF BREATH: 1
NAUSEA: 0
VOMITING: 0
ABDOMINAL PAIN: 0
COUGH: 1

## 2021-03-06 ASSESSMENT — PAIN DESCRIPTION - PAIN TYPE: TYPE: ACUTE PAIN

## 2021-03-06 ASSESSMENT — PAIN DESCRIPTION - FREQUENCY: FREQUENCY: INTERMITTENT

## 2021-03-06 ASSESSMENT — PAIN SCALES - GENERAL
PAINLEVEL_OUTOF10: 0
PAINLEVEL_OUTOF10: 1
PAINLEVEL_OUTOF10: 0

## 2021-03-06 ASSESSMENT — PAIN DESCRIPTION - DESCRIPTORS: DESCRIPTORS: DISCOMFORT;ACHING

## 2021-03-06 ASSESSMENT — PAIN DESCRIPTION - LOCATION: LOCATION: BACK

## 2021-03-06 NOTE — PROGRESS NOTES
Arrived to floor from ED in   W/C   LOC x 4  Denies c/o states he is breathing better St on tel 110-120's on HHN steroids and iv steroids, up ad ben, aware to save urine, lungs diminished , scattered wheezing bibase? Fine crackles LT base, loose cough clear small amt pt states, oriented to room call light , COVID protocol for floor, droplet+/ air born isolation.      Declined 4 eye total assessment

## 2021-03-06 NOTE — CONSULTS
Clinical Pharmacy Progress Note  Medication History     Admit Date: 3/6/2021    List of of current medications patient is taking is complete. Home Medication list in EPIC updated to reflect changes noted below. Source of information: telephone interview with patient; Rx fill history (Surescripts)    Patient's home pharmacy:  [facility] ([phone])     Changes made to medication list:   Medications added:    Albuterol nebs   Albuterol MDI   Atorvastatin    Amlodipine   Lisinopril-HCTZ   Diphenhydramine - pt takes about every other day for allergies    Naproxen    Cetirizine    Please call with any questions.   Fox Middleton, PharmD, [facility] # [phone]  3/6/2021 4:15 PM

## 2021-03-06 NOTE — H&P
Hospital Medicine History & Physical      PCP: Shane Zee MD    Date of Admission: 3/6/2021    Date of Service: Pt seen/examined on 03/06/2020 and Admitted to Inpatient with expected LOS greater than two midnights due to medical therapy. Chief Complaint: Shortness of breath      History Of Present Illness:    72 y.o. male [address] with history of COPD, hypertension, hyperlipidemia, has had shortness of breath for the last few weeks, progressively worse the last 2 to 3 days, last night he could not even go to sleep due to shortness of breath. He says he is unable to go a few steps before getting short of breath, he says he felt he is going to die today if he were not coming to the hospital..  Also endorses leg swelling which has been present but worse this week. Emergency room he was tachypneic 25/min, heart rate 98 to 105/min, 93 to 97% on room air. Labs showed sodium 133, proBNP normal, initial troponin negative, alkaline phos 149, no leukocytosis. X-ray showed hyperinflated lungs, prominent interstitial markings. EKG showed right bundle branch block, normal sinus rhythm. Similar complaint and admission in 04/2020 - Patient was admitted from home with dyspnea. Treated for COPD exacerbation with possible CHF. CHF ruled out. COVID-19 ruled out. Improved with bronchodilators and steroids. Seen by cardiology- advised on outpatient stress test. Discharged home in stable condition with outpatient follow up. Past Medical History:          Diagnosis Date    COPD (chronic obstructive pulmonary disease) ([address]     Hypertension     Obesity        Past Surgical History:      History reviewed. No pertinent surgical history. Medications Prior to Admission:      Prior to Admission medications    Medication Sig Start Date End Date Taking?  Authorizing Provider   albuterol (PROVENTIL) (2.5 MG/3ML) 0.083% nebulizer solution Take by nebulization every 4-6 hours as needed for Shortness of Breath Yes Historical Provider, MD   albuterol sulfate  (90 Base) MCG/ACT inhaler Inhale 2 puffs into the lungs every 4 hours as needed for Shortness of Breath 2/26/21  Yes Historical Provider, MD   amLODIPine (NORVASC) 5 MG tablet Take 5 mg by mouth daily   Yes Historical Provider, MD   atorvastatin (LIPITOR) 20 MG tablet Take 20 mg by mouth daily   Yes Historical Provider, MD   lisinopril-hydroCHLOROthiazide (PRINZIDE;ZESTORETIC) 20-12.5 MG per tablet Take 1 tablet by mouth daily   Yes Historical Provider, MD   diphenhydrAMINE (BENADRYL) 25 MG tablet Take 25 mg by mouth every other day   Yes Historical Provider, MD   naproxen sodium (ALEVE) 220 MG tablet Take 220 mg by mouth 2 times daily as needed for Pain   Yes Historical Provider, MD   cetirizine (ZYRTEC) 10 MG tablet Take 10 mg by mouth daily   Yes Historical Provider, MD       Allergies:  Patient has no known allergies. Social History:      The patient currently lives at home    TOBACCO:   reports that he has never smoked. He has never used smokeless tobacco.  ETOH:   reports previous alcohol use. Family History:      Reviewed    History reviewed. No pertinent family history. REVIEW OF SYSTEMS:   Pertinent positives as noted in the HPI. All other systems reviewed and negative. PHYSICAL EXAM PERFORMED:    BP (!) 147/91   Pulse 110   Temp 97.7 °F (36.5 °C) (Oral)   Resp 25   Ht 6' (1.829 m)   Wt 240 lb (108.9 kg)   SpO2 96%   BMI 32.55 kg/m²     General appearance: Ill-appearing, tachypneic,. HEENT:  Normal cephalic, atraumatic without obvious deformity. Pupils equal, round, and reactive to light. Extra ocular muscles intact. Conjunctivae/corneas clear. Neck: Supple, with full range of motion. No jugular venous distention. Trachea midline.   Respiratory: Increased respiratory effort, diffuse decreased breath sound in all lung zones  Cardiovascular: Tachycardia, regular, no murmurs appreciated  Abdomen: Firm, nontender, some distention, bowel sounds present  Musculoskeletal:  No clubbing, cyanosis, 2+ edema bilaterally lower extremity. Full range of motion without deformity. Skin: Skin color, texture, turgor normal.  No rashes or lesions. Neurologic:  Neurovascularly intact without any focal sensory/motor deficits. Cranial nerves: II-XII intact, grossly non-focal.  Psychiatric:  Alert and oriented, thought content appropriate, normal insight  Capillary Refill: Brisk,< 3 seconds   Peripheral Pulses: +2 palpable, equal bilaterally       Labs:     Recent Labs     03/06/21  0849   WBC 7.8   HGB 13.2*   HCT 40.5        Recent Labs     03/06/21  0849   *   K 4.7   CL 99   CO2 23   BUN 19   CREATININE 1.2   CALCIUM 9.4     Recent Labs     03/06/21  0849   AST 27   ALT 24   BILITOT 0.5   ALKPHOS 149*     No results for input(s): INR in the last 72 hours. Recent Labs     03/06/21  0849   TROPONINI <0.01       Urinalysis:    No results found for: Jeannie Shaina, BACTERIA, RBCUA, BLOODU, [address], GLUCOSEU    Radiology:     CXR: I have reviewed the CXR with the following interpretation: Reviewed, increased interstitial markings  EKG:  I have reviewed the EKG with the following interpretation:  Normal sinus rhythm, right bundle branch block, acute ST changes appreciated    XR CHEST PORTABLE   Final Result      1. Some prominent interstitial markings noted medial lung bases and right suprahilar   2. Hyperinflated lungs without segmental opacity                 TTE 04/2020  Summary   Technically difficult examination. Echo contrast used for delineating   endocardial borders. Left ventricular cavity size is normal. Normal left ventricular wall   thickness. Left ventricular function is normal with ejection fraction   estimated at 55-60%. Abnormal septal motion is present, possibly related to   an IVCD. Normal diastolic function. Aortic valve appears thickened/calcified but opens adequately.    Tricuspid valve is structurally normal. Mild tricuspid regurgitation. Estimated pulmonary artery systolic pressure is at 33 mmHg assuming a right   atrial pressure of 3 mmHg. ASSESSMENT:    Active Hospital Problems    Diagnosis Date Noted    COPD with acute exacerbation ([facility] [J44.1] 03/06/2021     1. Dyspnea on exertion  2. Acute respiratory failure, increased work of breathing, tachypneic, use of accessory muscle respiration SPECT likely due to COPD with acute exacerbation, but lower extremity edema and dyspnea exertion also suspect underlying heart failure possibly right heart failure  3. Hypertension  4. Hyperlipidemia  5. Former smoker, quit 12 years ago    PLAN:  Clinically appears to be both COPD as well as heart failure  I will start him on Solu-Medrol and scheduled breathing treatments, will transition to prednisone in 48 hours  Start azithromycin 500 mg today and then to 50 x 4 days  I will give him a dose of 40 mg IV Lasix to see response  Strict input and output  Daily weights  TTE ordered for further evaluation  Monitor on continuous telemetry    DVT Prophylaxis: Lovenox  Diet: DIET LOW SODIUM 2 GM;  Code Status: Full Code    PT/OT Eval Status: n/a     Dispo - Admit as inpatient. I anticipate hospitalization spanning more than two midnights for investigation and treatment of the above medically necessary diagnoses. Karson Candelario MD   Hospitalist    Thank you Thai Farris MD for the opportunity to be involved in this patient's care. If you have any questions or concerns please feel free to contact me at [phone].

## 2021-03-06 NOTE — ED PROVIDER NOTES
[address] ENCOUNTER          ATTENDING PHYSICIAN NOTE       Date of evaluation: 3/6/2021    Chief Complaint     Shortness of Breath (x 2 weeks, increased last night ), Leg Swelling (bilateral x 2 weeks), and Back Pain    History of Present Illness     Cori Angela is a 72 y.o. male who presents with shortness of breath and chest pain. Patient has a history of COPD not on home oxygen however he does use CPAP overnight. Patient states that over the last 1 to 2 weeks he has had gradual onset, gradually worsening shortness of breath that is worse with exertion as well as bilateral swelling of his lower extremities, yesterday evening he began to have acute worsening in his shortness of breath as well as chest pain with radiation to the neck and left arm. Patient states that chest pain has completely resolved however he continues to have shortness of breath and wheezing. On arrival patient is afebrile and saturating well on room air however he is tachypneic and has accessory muscle usage. Review of Systems     Review of Systems   Constitutional: Positive for chills. Negative for fatigue and fever. HENT: Negative for congestion and sore throat. Respiratory: Positive for cough and shortness of breath. Cardiovascular: Positive for chest pain and leg swelling. Negative for palpitations. Gastrointestinal: Negative for abdominal pain, diarrhea, nausea and vomiting. Genitourinary: Negative for dysuria and hematuria. Musculoskeletal: Negative for arthralgias, back pain, myalgias, neck pain and neck stiffness. Skin: Negative for rash and wound. Neurological: Negative for syncope, light-headedness and headaches. Hematological: Does not bruise/bleed easily. Psychiatric/Behavioral: Negative for confusion.        Past Medical, Surgical, Family, and Social History     He has a past medical history of COPD (chronic obstructive pulmonary disease) ([address] Hypertension, and Obesity. He has no past surgical history on file. His family history is not on file. He reports that he has never smoked. He has never used smokeless tobacco. He reports previous alcohol use. He reports that he does not use drugs. Medications     Previous Medications    No medications on file       Allergies     He has No Known Allergies. Physical Exam     INITIAL VITALS: BP: (!) 160/83, Temp: 97.9 °F (36.6 °C), Pulse: 98, Resp: 24, SpO2: 99 %   Physical Exam  Constitutional:       General: He is not in acute distress. Appearance: He is well-developed. He is obese. He is not toxic-appearing. HENT:      Head: Normocephalic and atraumatic. Mouth/Throat:      Mouth: Mucous membranes are moist.      Pharynx: Oropharynx is clear. Eyes:      Extraocular Movements: Extraocular movements intact. Pupils: Pupils are equal, round, and reactive to light. Neck:      Musculoskeletal: Normal range of motion and neck supple. Cardiovascular:      Rate and Rhythm: Regular rhythm. Tachycardia present. Pulses: Normal pulses. Pulmonary:      Effort: Tachypnea, accessory muscle usage and respiratory distress present. Chest:      Chest wall: No tenderness. There is no dullness to percussion. Abdominal:      Palpations: Abdomen is soft. Musculoskeletal: Normal range of motion. Right lower leg: He exhibits no tenderness. Edema present. Left lower leg: He exhibits no tenderness. Edema present. Comments: +1-2 pitting edema bilaterally   Skin:     General: Skin is warm and dry. Capillary Refill: Capillary refill takes less than 2 seconds. Neurological:      General: No focal deficit present. Mental Status: He is alert and oriented to person, place, and time.    Psychiatric:         Mood and Affect: Mood normal.         Diagnostic Results     EKG   EKG Interpretation    Interpreted by emergency department physician    Rhythm: normal sinus   Rate: normal  Axis: left  Ectopy: none  Conduction: right bundle branch block (complete)  ST Segments: no acute change  T Waves: no acute change    Clinical Impression: non-specific EKG    Leonides Harris      RADIOLOGY:  XR CHEST PORTABLE   Final Result      1. Some prominent interstitial markings noted medial lung bases and right suprahilar   2.  Hyperinflated lungs without segmental opacity                   LABS:   Results for orders placed or performed during the hospital encounter of 03/06/21   CBC Auto Differential   Result Value Ref Range    WBC 7.8 4.0 - 11.0 K/uL    RBC 4.53 4.20 - 5.90 M/uL    Hemoglobin 13.2 (L) 13.5 - 17.5 g/dL    Hematocrit 40.5 40.5 - 52.5 %    MCV 89.5 80.0 - 100.0 fL    MCH 29.3 26.0 - 34.0 pg    MCHC 32.7 31.0 - 36.0 g/dL    RDW 13.5 12.4 - 15.4 %    Platelets 863 059 - 206 K/uL    MPV 8.8 5.0 - 10.5 fL    Neutrophils % 80.2 %    Lymphocytes % 13.3 %    Monocytes % 4.7 %    Eosinophils % 1.3 %    Basophils % 0.5 %    Neutrophils Absolute 6.3 1.7 - 7.7 K/uL    Lymphocytes Absolute 1.0 1.0 - 5.1 K/uL    Monocytes Absolute 0.4 0.0 - 1.3 K/uL    Eosinophils Absolute 0.1 0.0 - 0.6 K/uL    Basophils Absolute 0.0 0.0 - 0.2 K/uL   Comprehensive Metabolic Panel w/ Reflex to MG   Result Value Ref Range    Sodium 133 (L) 136 - 145 mmol/L    Potassium reflex Magnesium 4.7 3.5 - 5.1 mmol/L    Chloride 99 99 - 110 mmol/L    CO2 23 21 - 32 mmol/L    Anion Gap 11 3 - 16    Glucose 103 (H) 70 - 99 mg/dL    BUN 19 7 - 20 mg/dL    CREATININE 1.2 0.8 - 1.3 mg/dL    GFR Non-African American 60 (A) >60    GFR African American >60 >60    Calcium 9.4 8.3 - 10.6 mg/dL    Total Protein 7.5 6.4 - 8.2 g/dL    Albumin 4.8 3.4 - 5.0 g/dL    Albumin/Globulin Ratio 1.8 1.1 - 2.2    Total Bilirubin 0.5 0.0 - 1.0 mg/dL    Alkaline Phosphatase 149 (H) 40 - 129 U/L    ALT 24 10 - 40 U/L    AST 27 15 - 37 U/L    Globulin 2.7 g/dL   Troponin   Result Value Ref Range    Troponin <0.01 <0.01 ng/mL   Brain Natriuretic Peptide   Result Value Ref Range Pro- 0 - 124 pg/mL   EKG 12 Lead   Result Value Ref Range    Ventricular Rate 98 BPM    Atrial Rate 98 BPM    P-R Interval 196 ms    QRS Duration 146 ms    Q-T Interval 370 ms    QTc Calculation (Bazett) 472 ms    P Axis 85 degrees    R Axis 270 degrees    T Axis 77 degrees    Diagnosis       EKG performed in ER and to be interpreted by ER physician. Confirmed by MD, ER ([id number]),  Jonathan Quintana ([id number]) on 3/6/2021 11:31:00 AM     RECENT VITALS:  BP: 138/84,Temp: 97.9 °F (36.6 °C), Pulse: 100, Resp: 25, SpO2: 93 %     Procedures     ED Course     Nursing Notes, Past Medical Hx, Past Surgical Hx, Social Hx,Allergies, and Family Hx were reviewed. patient was given the following medications:  Orders Placed This Encounter   Medications    methylPREDNISolone sodium (SOLU-MEDROL) injection 125 mg    ipratropium-albuterol (DUONEB) nebulizer solution 1 ampule       CONSULTS:  IP CONSULT TO HOSPITALIST    MEDICAL DECISIONMAKING / ASSESSMENT / Samanta Barbara is a 72 y.o. male who presents with shortness of breath with history of COPD. Given patient's history of COPD we will plan for chest x-ray, troponins for his chest pain and BNP given his lower extremity swelling. In the interim we will plan to provide steroids and duo nebs. Patient laboratory work was reassuring, troponin was negative and BNP was within normal limits, chest x-ray without focal consolidation or effusion to suggest infection, no pneumothorax noted. EKG without concern for ischemia or infarction. On repeat evaluation patient reports somewhat improved work of breathing however he continues to have conversational dyspnea and on ambulation patient drops down into the mid to high 80s but recovers with rest.  Given patient's continued shortness of breath and hypoxia with ambulation we will plan on admission medicine, I have personally spoken with accepting medical provider. Clinical Impression     1.  COPD exacerbation ([facility] [facility] 2. Hypoxia        Disposition     PATIENT REFERRED TO:  No follow-up provider specified.     DISCHARGE MEDICATIONS:  New Prescriptions    No medications on file       DISPOSITION Decision To Admit 03/06/2021 11:39:38 AM       Clearance MD Gerry  03/06/21 1200

## 2021-03-06 NOTE — ED NOTES
Pt ambulated in room with pulse oximeter. He started at 94% and decreased to 87%. Patient was assisted to his bed and his sats went returned stable at 93% on RA.       Jeferson Edmond RN  03/06/21 1028

## 2021-03-07 LAB
ALBUMIN SERPL-MCNC: 4.5 G/DL (ref 3.4–5)
ANION GAP SERPL CALCULATED.3IONS-SCNC: 15 MMOL/L (ref 3–16)
BASOPHILS ABSOLUTE: 0 K/UL (ref 0–0.2)
BASOPHILS RELATIVE PERCENT: 0.1 %
BUN BLDV-MCNC: 30 MG/DL (ref 7–20)
CALCIUM SERPL-MCNC: 9 MG/DL (ref 8.3–10.6)
CHLORIDE BLD-SCNC: 96 MMOL/L (ref 99–110)
CO2: 21 MMOL/L (ref 21–32)
CREAT SERPL-MCNC: 1.6 MG/DL (ref 0.8–1.3)
CREATININE URINE: 92.3 MG/DL (ref 39–259)
EOSINOPHILS ABSOLUTE: 0 K/UL (ref 0–0.6)
EOSINOPHILS RELATIVE PERCENT: 0 %
GFR AFRICAN AMERICAN: 52
GFR NON-AFRICAN AMERICAN: 43
GLUCOSE BLD-MCNC: 144 MG/DL (ref 70–99)
HCT VFR BLD CALC: 37.7 % (ref 40.5–52.5)
HEMOGLOBIN: 12.4 G/DL (ref 13.5–17.5)
LYMPHOCYTES ABSOLUTE: 0.8 K/UL (ref 1–5.1)
LYMPHOCYTES RELATIVE PERCENT: 12.3 %
MCH RBC QN AUTO: 29.2 PG (ref 26–34)
MCHC RBC AUTO-ENTMCNC: 32.9 G/DL (ref 31–36)
MCV RBC AUTO: 88.8 FL (ref 80–100)
MONOCYTES ABSOLUTE: 0.1 K/UL (ref 0–1.3)
MONOCYTES RELATIVE PERCENT: 1.2 %
NEUTROPHILS ABSOLUTE: 5.3 K/UL (ref 1.7–7.7)
NEUTROPHILS RELATIVE PERCENT: 86.4 %
PDW BLD-RTO: 13.5 % (ref 12.4–15.4)
PHOSPHORUS: 4.1 MG/DL (ref 2.5–4.9)
PLATELET # BLD: 201 K/UL (ref 135–450)
PMV BLD AUTO: 8.6 FL (ref 5–10.5)
POTASSIUM SERPL-SCNC: 4.8 MMOL/L (ref 3.5–5.1)
PROTEIN PROTEIN: 7 MG/DL
PROTEIN/CREAT RATIO: 0.1 MG/DL
RBC # BLD: 4.25 M/UL (ref 4.2–5.9)
SARS-COV-2, PCR: NOT DETECTED
SODIUM BLD-SCNC: 132 MMOL/L (ref 136–145)
WBC # BLD: 6.2 K/UL (ref 4–11)

## 2021-03-07 PROCEDURE — 36415 COLL VENOUS BLD VENIPUNCTURE: CPT

## 2021-03-07 PROCEDURE — 2580000003 HC RX 258: Performed by: INTERNAL MEDICINE

## 2021-03-07 PROCEDURE — 94640 AIRWAY INHALATION TREATMENT: CPT

## 2021-03-07 PROCEDURE — 6370000000 HC RX 637 (ALT 250 FOR IP): Performed by: INTERNAL MEDICINE

## 2021-03-07 PROCEDURE — 85025 COMPLETE CBC W/AUTO DIFF WBC: CPT

## 2021-03-07 PROCEDURE — 94664 DEMO&/EVAL PT USE INHALER: CPT

## 2021-03-07 PROCEDURE — 80069 RENAL FUNCTION PANEL: CPT

## 2021-03-07 PROCEDURE — 84156 ASSAY OF PROTEIN URINE: CPT

## 2021-03-07 PROCEDURE — 2060000000 HC ICU INTERMEDIATE R&B

## 2021-03-07 PROCEDURE — 6360000002 HC RX W HCPCS: Performed by: INTERNAL MEDICINE

## 2021-03-07 PROCEDURE — 94761 N-INVAS EAR/PLS OXIMETRY MLT: CPT

## 2021-03-07 PROCEDURE — 82570 ASSAY OF URINE CREATININE: CPT

## 2021-03-07 RX ORDER — SODIUM CHLORIDE 9 MG/ML
INJECTION, SOLUTION INTRAVENOUS CONTINUOUS
Status: DISCONTINUED | OUTPATIENT
Start: 2021-03-07 | End: 2021-03-07

## 2021-03-07 RX ADMIN — Medication 10 ML: at 07:56

## 2021-03-07 RX ADMIN — IPRATROPIUM BROMIDE AND ALBUTEROL 1 PUFF: 20; 100 SPRAY, METERED RESPIRATORY (INHALATION) at 05:28

## 2021-03-07 RX ADMIN — METHYLPREDNISOLONE SODIUM SUCCINATE 40 MG: 40 INJECTION, POWDER, FOR SOLUTION INTRAMUSCULAR; INTRAVENOUS at 03:44

## 2021-03-07 RX ADMIN — ATORVASTATIN CALCIUM 20 MG: 20 TABLET, FILM COATED ORAL at 19:49

## 2021-03-07 RX ADMIN — METOPROLOL TARTRATE 25 MG: 25 TABLET, FILM COATED ORAL at 19:48

## 2021-03-07 RX ADMIN — Medication 6 MG: at 22:06

## 2021-03-07 RX ADMIN — IPRATROPIUM BROMIDE AND ALBUTEROL 1 PUFF: 20; 100 SPRAY, METERED RESPIRATORY (INHALATION) at 00:50

## 2021-03-07 RX ADMIN — METHYLPREDNISOLONE SODIUM SUCCINATE 40 MG: 40 INJECTION, POWDER, FOR SOLUTION INTRAMUSCULAR; INTRAVENOUS at 22:06

## 2021-03-07 RX ADMIN — METHYLPREDNISOLONE SODIUM SUCCINATE 40 MG: 40 INJECTION, POWDER, FOR SOLUTION INTRAMUSCULAR; INTRAVENOUS at 10:18

## 2021-03-07 RX ADMIN — IPRATROPIUM BROMIDE AND ALBUTEROL 1 PUFF: 20; 100 SPRAY, METERED RESPIRATORY (INHALATION) at 13:21

## 2021-03-07 RX ADMIN — IPRATROPIUM BROMIDE AND ALBUTEROL 1 PUFF: 20; 100 SPRAY, METERED RESPIRATORY (INHALATION) at 09:13

## 2021-03-07 RX ADMIN — IPRATROPIUM BROMIDE AND ALBUTEROL 1 PUFF: 20; 100 SPRAY, METERED RESPIRATORY (INHALATION) at 16:29

## 2021-03-07 RX ADMIN — SODIUM CHLORIDE: 9 INJECTION, SOLUTION INTRAVENOUS at 13:02

## 2021-03-07 RX ADMIN — Medication 10 ML: at 19:49

## 2021-03-07 RX ADMIN — METOPROLOL TARTRATE 25 MG: 25 TABLET, FILM COATED ORAL at 07:54

## 2021-03-07 RX ADMIN — ENOXAPARIN SODIUM 40 MG: 40 INJECTION SUBCUTANEOUS at 07:55

## 2021-03-07 RX ADMIN — AZITHROMYCIN MONOHYDRATE 250 MG: 250 TABLET ORAL at 07:55

## 2021-03-07 RX ADMIN — IPRATROPIUM BROMIDE AND ALBUTEROL 1 PUFF: 20; 100 SPRAY, METERED RESPIRATORY (INHALATION) at 21:09

## 2021-03-07 RX ADMIN — AMLODIPINE BESYLATE 5 MG: 5 TABLET ORAL at 07:54

## 2021-03-07 RX ADMIN — LISINOPRIL 5 MG: 5 TABLET ORAL at 07:54

## 2021-03-07 RX ADMIN — METHYLPREDNISOLONE SODIUM SUCCINATE 40 MG: 40 INJECTION, POWDER, FOR SOLUTION INTRAMUSCULAR; INTRAVENOUS at 17:50

## 2021-03-07 ASSESSMENT — PAIN SCALES - GENERAL
PAINLEVEL_OUTOF10: 0

## 2021-03-07 NOTE — PROGRESS NOTES
Physician Progress Note      Breann Montelongo  [facility] #:                  [id number]  :                       1950  ADMIT DATE:       3/6/2021 8:21 AM  [address] [place] DATE:  RESPONDING  PROVIDER #:        Radha Lin MD          QUERY TEXT:    Dear Provider,    Patient admitted with COPD with acute exacerbation. Noted  H & P documentation   of \"acute respiratory failure. ..  increased work of breathing, tachypneic,   use of accessory muscle respiration\" in the medical record. In order to   support the diagnosis of acute respiratory failure, please include additional   clinical indicators in your documentation. Or please document if the   diagnosis of acute respiratory failure has been ruled out after further study. The medical record reflects the following:  Risk Factors: COPD exacerbation, no home 02, uses CPAP at night  Clinical Indicators: COPD with acute exacerbation. Wheezing. H & P   documentation of \"acute respiratory failure. .. increased work of breathing,   tachypneic, use of accessory muscle respiration\" . RR 10-24. RA saturations   90-99% with isolated drop to 85-89%. COVID 19: pending  Treatment: Monitoring respiratory status, IV solumedrol, Breathing treatments,   IV Lasix 40 X 1    Acute Respiratory Failure Clinical Indicators per [employer] Training Guide and   Quick Reference Guide:  pO2 < 60 mmHg or SpO2 (pulse oximetry) < 91% breathing room air  pCO2 > 50 and pH < 7.35  P/F ratio (pO2 / FIO2) < 300  pO2 decrease or pCO2 increase by 10 mmHg from baseline (if known)  Supplemental oxygen of 40% or more  Presence of respiratory distress, tachypnea, dyspnea, shortness of breath,   wheezing  Unable to speak in complete sentences  Use of accessory muscles to breathe  Extreme anxiety and feeling of impending doom  Tripod position  Confusion/altered mental status/obtunded  Options provided:  -- Acute Respiratory Failure as evidenced by, Please document evidence.   -- Acute Respiratory Failure ruled out after study.   -- Other - I will add my own diagnosis  -- Disagree - Not applicable / Not valid  -- Disagree - Clinically unable to determine / Unknown  -- Refer to Clinical Documentation Reviewer    PROVIDER RESPONSE TEXT:    This patient is in acute respiratory failure as evidenced by increased work of   breathing, unable to walk even 10 feet witthout getting winded    Query created by: Frankey Jetty on 3/7/2021 9:33 AM      Electronically signed by:  Kami Conti MD 3/7/2021 5:53 PM

## 2021-03-07 NOTE — PROGRESS NOTES
Hospitalist Progress Note      PCP: Perlita Engle MD    Date of Admission: 3/6/2021    Chief Complaint: shortness of breath    Hospital Course:   72 y.o. male [address] with history of COPD, hypertension, hyperlipidemia, has had shortness of breath, suspect due to heart failure and COPD    Subjective:   Feels much better. Happy that his COVID-19 testing is negative  Says he had significant urine output with lasix. Medications:  Reviewed    Infusion Medications   Scheduled Medications    sodium chloride flush  10 mL Intravenous 2 times per day    enoxaparin  40 mg Subcutaneous Daily    methylPREDNISolone  40 mg Intravenous Q6H    Followed by   Kelley Comer ON 3/9/2021] predniSONE  40 mg Oral Daily    azithromycin  250 mg Oral Daily    amLODIPine  5 mg Oral Daily    atorvastatin  20 mg Oral Nightly    cetirizine  10 mg Oral Daily    [Held by provider] lisinopril-hydroCHLOROthiazide  1 tablet Oral Daily    albuterol-ipratropium  1 puff Inhalation 6 times per day    metoprolol tartrate  25 mg Oral BID    lisinopril  5 mg Oral Daily     PRN Meds: sodium chloride flush, promethazine **OR** ondansetron, polyethylene glycol, acetaminophen **OR** acetaminophen, perflutren lipid microspheres, melatonin      Intake/Output Summary (Last 24 hours) at 3/7/2021 0751  Last data filed at 3/6/2021 2141  Gross per 24 hour   Intake 340 ml   Output 900 ml   Net -560 ml       Physical Exam Performed:    /69   Pulse 95   Temp 98.1 °F (36.7 °C) (Oral)   Resp 20   Ht 6' (1.829 m)   Wt 243 lb 9.7 oz (110.5 kg)   SpO2 94%   BMI 33.04 kg/m²     General appearance: No apparent distress, appears stated age and cooperative. HEENT: Pupils equal, round, and reactive to light. Conjunctivae/corneas clear. Neck: Supple, with full range of motion. No jugular venous distention. Trachea midline. Respiratory:  Normal respiratory effort.   Better air entry, now able to appreciate diffuse wheezing. Cardiovascular: Regular rate and rhythm with normal S1/S2 without murmurs, rubs or gallops. Abdomen: Soft, non-tender, non-distended with normal bowel sounds. Musculoskeletal: No clubbing, cyanosis or edema bilaterally. Full range of motion without deformity. Skin: Skin color, texture, turgor normal.  No rashes or lesions. Neurologic:  Neurovascularly intact without any focal sensory/motor deficits. Cranial nerves: II-XII intact, grossly non-focal.  Psychiatric: Alert and oriented, thought content appropriate, normal insight  Capillary Refill: Brisk,< 3 seconds   Peripheral Pulses: +2 palpable, equal bilaterally       Labs:   Recent Labs     03/06/21  0849 03/07/21  0353   WBC 7.8 6.2   HGB 13.2* 12.4*   HCT 40.5 37.7*    201     Recent Labs     03/06/21  0849   *   K 4.7   CL 99   CO2 23   BUN 19   CREATININE 1.2   CALCIUM 9.4     Recent Labs     03/06/21  0849   AST 27   ALT 24   BILITOT 0.5   ALKPHOS 149*     No results for input(s): INR in the last 72 hours. Recent Labs     03/06/21  0849   TROPONINI <0.01       Urinalysis:    No results found for: Nalini Brunt, BACTERIA, RBCUA, BLOODU, SPECGRAV, GLUCOSEU    Radiology:  XR CHEST PORTABLE   Final Result      1. Some prominent interstitial markings noted medial lung bases and right suprahilar   2. Hyperinflated lungs without segmental opacity                       Assessment/Plan:    Active Hospital Problems    Diagnosis Date Noted    COPD with acute exacerbation ([facility] [J44.1] 03/06/2021     1. Dyspnea on exertion  2. Acute respiratory failure, increased work of breathing, tachypneic, use of accessory muscle respiration SPECT likely due to COPD with acute exacerbation, but lower extremity edema and dyspnea exertion also suspect underlying heart failure possibly right heart failure  3. Acute kidney injury not present on admission, due to diuresis  4. Hypertension  5. Hyperlipidemia  6.  Former smoker, quit 12 years ago     PLAN:  Clinically appears to be both COPD as well as heart failure  Continue Solu-Medrol and scheduled breathing treatments, will transition to prednisone on Monday  z-pack  S/p 40 mg IV Lasix, good response but creatinine went up to 1.6 and now systolic blood pressures in the 90s. He feels good  For now I will hold his lisinopril/hold further diuretics  Renal panel daily  Strict input and output  Daily weights  TTE ordered for further evaluation  Monitor on continuous telemetry    DVT Prophylaxis: Lovenox  Diet: DIET LOW SODIUM 2 GM;  Code Status: Full Code    PT/OT Eval Status: N/A    Dispo - Continue inpatient care, likely discharge on Monday creatinine recovers, after TTE is done and on oral prednisone x5 days. He needs to establish care with pulmonary.     Andres Chaidez MD   Hospitalist

## 2021-03-07 NOTE — PLAN OF CARE
Problem: Falls - Risk of:  Goal: Will remain free from falls  Description: Will remain free from falls  Outcome: Ongoing  Goal: Absence of physical injury  Description: Absence of physical injury  Outcome: Ongoing     Problem: Airway Clearance - Ineffective  Goal: Achieve or maintain patent airway  Outcome: Ongoing     Problem: Gas Exchange - Impaired  Goal: Absence of hypoxia  Outcome: Ongoing  Goal: Promote optimal lung function  Outcome: Ongoing     Problem: Body Temperature -  Risk of, Imbalanced  Goal: Ability to maintain a body temperature within defined limits  Outcome: Ongoing  Goal: Will regain or maintain usual level of consciousness  Outcome: Ongoing  Goal: Complications related to the disease process, condition or treatment will be avoided or minimized  Outcome: Ongoing     Problem: Isolation Precautions - Risk of Spread of Infection  Goal: Prevent transmission of infection  Outcome: Ongoing     Problem: Nutrition Deficits  Goal: Optimize nutritional status  Outcome: Ongoing     Problem: Fatigue  Goal: Verbalize increase energy and improved vitality  Outcome: Ongoing   Continue with plan of care.

## 2021-03-07 NOTE — PLAN OF CARE
Problem: Falls - Risk of:  Goal: Will remain free from falls  Description: Will remain free from falls   by Marilou Goldman RN  Outcome: Completed  Note: Not a fall risk     Problem: Falls - Risk of:  Goal: Absence of physical injury  Description: Absence of physical injury   by Marilou Goldman RN  Outcome: Completed     Problem: Breathing Pattern - Ineffective  Goal: Ability to achieve and maintain a regular respiratory rate   by Marilou Goldman RN  Outcome: Completed  Note:  mild sob with activity, lungs diminished scattered wheezing left base crackle received lasix in ED  R?O COVID air born and droplet++ isolation in place     Problem: Nutrition Deficits  Goal: Optimize nutritional status  by Marilou Goldman RN  Outcome: Completed     Problem: Loneliness or Risk for Loneliness  Goal: Demonstrate positive use of time alone when socialization is not possible  by Marilou Goldman RN  Outcome: Completed

## 2021-03-07 NOTE — PROGRESS NOTES
RESPIRATORY THERAPY ASSESSMENT    Name:  Chela Abel Record Number:  [id number]  Age: 72 y.o. Gender: male  : 1950  Today's Date:  3/6/2021  Room:  [address] [place] [address]    Assessment     Is the patient being admitted for a COPD or Asthma exacerbation? No   (If yes the patient will be seen every 4 hours for the first 24 hours and then reassessed)    Patient Admission Diagnosis: Shortness of breath      Allergies  No Known Allergies    Minimum Predicted Vital Capacity:     1173          Actual Vital Capacity:      1500              Pulmonary History:former smoker  Home Oxygen Therapy:  room air  Home Respiratory Therapy:Albuterol prn  Current Respiratory Therapy:  Combivent Q6h  Treatment Type: Aerosol generator(VMN)  Medications: Albuterol/Ipratropium    Respiratory Severity Index(RSI)   Patients with orders for inhalation medications, oxygen, or any therapeutic treatment modality will be placed on Respiratory Protocol. They will be assessed with the first treatment and at least every 72 hours thereafter. The following severity scale will be used to determine frequency of treatment intervention. Smoking History: Pulmonary Disease or Smoking History, Greater than 15 pack year = 2    Social History  Social History     Tobacco Use    Smoking status: Never Smoker    Smokeless tobacco: Never Used   Substance Use Topics    Alcohol use: Not Currently    Drug use: Never       Recent Surgical History: None = 0  Past Surgical History  History reviewed. No pertinent surgical history.     Level of Consciousness: Alert, Oriented, and Cooperative = 0    Level of Activity: Walking unassisted = 0    Respiratory Pattern: Regular Pattern; RR 8-20 = 0    Breath Sounds: Diminshed bilaterally and/or crackles = 2    Sputum   ,  ,    Cough: Strong, spontaneous, non-productive = 0    Vital Signs   BP (!) 147/91   Pulse 110   Temp 97.7 °F (36.5 °C) (Oral)   Resp 25   Ht 6' (1.829 m)   Wt 240 lb (108.9 kg)   SpO2 96%   BMI 32.55 kg/m²   SPO2 (COPD values may differ): Greater than or equal to 92% on room air = 0    Peak Flow (asthma only): not applicable = 0    RSI: 0-4 = See once and convert to home regimen or discontinue        Plan       Goals: medication delivery    Patient/caregiver was educated on the proper method of use for Respiratory Care Devices:  Yes       Response to education:  Good     Is patient being placed on Home Treatment Regimen? No     Does the patient have everything they need prior to discharge? Yes     Plan of Care: Combivent Q4h. Patient here for shortness of breath    Electronically signed by Jacinto Katz RCP on 3/6/2021 at 7:14 PM    Respiratory Protocol Guidelines     1. Assessment and treatment by Respiratory Therapy will be initiated for medication and therapeutic interventions upon initiation of aerosolized medication. 2. Physician will be contacted for respiratory rate (RR) greater than 35 breaths per minute. Therapy will be held for heart rate (HR) greater than 140 beats per minute, pending direction from physician. 3. Bronchodilators will be administered via Metered Dose Inhaler (MDI) with spacer when the following criteria are met:  a. Alert and cooperative     b. HR < 140 bpm  c. RR < 30 bpm                d. Can demonstrate a 2-3 second inspiratory hold  4. Bronchodilators will be administered via Hand Held Nebulizer SHANTEL [facility]) to patients when ANY of the following criteria are met  a. Incognizant or uncooperative          b. Patients treated with HHN at Home        c. Unable to demonstrate proper use of MDI with spacer     d. RR > 30 bpm   5. Bronchodilators will be delivered via Metered Dose Inhaler (MDI), HHN, Aerogen to intubated patients on mechanical ventilation. 6. Inhalation medication orders will be delivered and/or substituted as outlined below. Aerosolized Medications Ordering and Administration Guidelines:    1.  All Medications will be ordered by a physician, and their frequency and/or modality will be adjusted as defined by the patients Respiratory Severity Index (RSI) score. 2. If the patient does not have documented COPD, consider discontinuing anticholinergics when RSI is less than 9.  3. If the bronchospasm worsens (increased RSI), then the bronchodilator frequency can be increased to a maximum of every 4 hours. If greater than every 4 hours is required, the physician will be contacted. 4. If the bronchospasm improves, the frequency of the bronchodilator can be decreased, based on the patient's RSI, but not less than home treatment regimen frequency. 5. Bronchodilator(s) will be discontinued if patient has a RSI less than 9 and has received no scheduled or as needed treatment for 72  Hrs. Patients Ordered on a Mucolytic Agent:    1. Must always be administered with a bronchodilator. 2. Discontinue if patient experiences worsened bronchospasm, or secretions have lessened to the point that the patient is able to clear them with a cough. Anti-inflammatory and Combination Medications:    1. If the patient lacks prior history of lung disease, is not using inhaled anti-inflammatory medication at home, and lacks wheezing by examination or by history for at least 24 hours, contact physician for possible discontinuation.

## 2021-03-07 NOTE — PROGRESS NOTES
Soft B/P states this way at home after taking lisinopril etc denies c/o at present up walking in room

## 2021-03-07 NOTE — PROGRESS NOTES
Shift assessment completed, patient alert and oriented x4 VSS, patient denies pain at this time. Call light and personal belongings within reach, safety precautions in place. Continue to monitor.

## 2021-03-07 NOTE — PLAN OF CARE
Problem: Airway Clearance - Ineffective  Goal: Achieve or maintain patent airway  3/7/2021 0953 by Rolanda Daigle RN  Outcome: Met This Shift  Note: R/O COVID air born and droplet ++ precautions      Problem: Gas Exchange - Impaired  Goal: Absence of hypoxia  3/7/2021 0953 by Rolanda Daigle RN  Outcome: Met This Shift  Note: Room air at rest sats WNL's     Problem: Gas Exchange - Impaired  Goal: Promote optimal lung function  3/7/2021 0953 by Rolanda Daigle RN  Outcome: Met This Shift     Problem: Body Temperature -  Risk of, Imbalanced  Goal: Ability to maintain a body temperature within defined limits  3/7/2021 0953 by Rolanda Daigle RN  Outcome: Met This Shift  Note: No fever     Problem: Body Temperature -  Risk of, Imbalanced  Goal: Will regain or maintain usual level of consciousness  3/7/2021 0953 by Rolanda Daigle RN  Outcome: Met This Shift     Problem:  Body Temperature -  Risk of, Imbalanced  Goal: Complications related to the disease process, condition or treatment will be avoided or minimized  3/7/2021 0953 by Rolanda Daigle RN  Outcome: Met This Shift     Problem: Isolation Precautions - Risk of Spread of Infection  Goal: Prevent transmission of infection  3/7/2021 0953 by Rolanda Daigle RN  Outcome: Met This Shift  Note: See above     Problem: Risk for Fluid Volume Deficit  Goal: Maintain normal heart rhythm  3/7/2021 0953 by Rolanda Daigle RN  Outcome: Met This Shift  Note: SR this am      Problem: Risk for Fluid Volume Deficit  Goal: Maintain absence of muscle cramping  3/7/2021 0953 by Rolanda Daigle RN  Outcome: Met This Shift     Problem: Risk for Fluid Volume Deficit  Goal: Maintain normal serum potassium, sodium, calcium, phosphorus, and pH  3/7/2021 0953 by Rolanda Daigle RN  Outcome: Met This Shift     Problem: Fatigue  Goal: Verbalize increase energy and improved vitality  3/7/2021 0953 by Rolanda Daigle RN  Outcome: Met This Shift  Note: Feels better today     Problem: Patient Education: Go to Patient Education Activity  Goal: Patient/Family Education  3/7/2021 0953 by Chasity Bauman RN  Outcome: Met This Shift     Problem: Discharge Planning:  Goal: Discharged to appropriate level of care  Description: Discharged to appropriate level of care  3/7/2021 0953 by Chasity Bauman RN  Outcome: Met This Shift  Note: To home when medically stable     Problem: Falls - Risk of:  Goal: Will remain free from falls  Description: Will remain free from falls  3/7/2021 0953 by Chasity Bauman RN  Outcome: Completed  Note: Not a fall risk     Problem: Falls - Risk of:  Goal: Absence of physical injury  Description: Absence of physical injury  3/7/2021 0953 by Chasity Bauman RN  Outcome: Completed     Problem: Breathing Pattern - Ineffective  Goal: Ability to achieve and maintain a regular respiratory rate  3/7/2021 0953 by Chasity Bauman RN  Outcome: Completed  Note: Rsp easy feels less sob     Problem: Nutrition Deficits  Goal: Optimize nutritional status  3/7/2021 0953 by Chasity Bauman RN  Outcome: Completed     Problem: Loneliness or Risk for Loneliness  Goal: Demonstrate positive use of time alone when socialization is not possible  3/7/2021 0953 by Chasity Bauman RN  Outcome: Completed

## 2021-03-08 LAB
ALBUMIN SERPL-MCNC: 4.7 G/DL (ref 3.4–5)
ANION GAP SERPL CALCULATED.3IONS-SCNC: 15 MMOL/L (ref 3–16)
BASOPHILS ABSOLUTE: 0 K/UL (ref 0–0.2)
BASOPHILS RELATIVE PERCENT: 0.2 %
BUN BLDV-MCNC: 48 MG/DL (ref 7–20)
CALCIUM SERPL-MCNC: 9.1 MG/DL (ref 8.3–10.6)
CHLORIDE BLD-SCNC: 96 MMOL/L (ref 99–110)
CO2: 21 MMOL/L (ref 21–32)
CREAT SERPL-MCNC: 1.6 MG/DL (ref 0.8–1.3)
EOSINOPHILS ABSOLUTE: 0 K/UL (ref 0–0.6)
EOSINOPHILS RELATIVE PERCENT: 0 %
GFR AFRICAN AMERICAN: 52
GFR NON-AFRICAN AMERICAN: 43
GLUCOSE BLD-MCNC: 144 MG/DL (ref 70–99)
HCT VFR BLD CALC: 39.1 % (ref 40.5–52.5)
HEMOGLOBIN: 13.1 G/DL (ref 13.5–17.5)
LV EF: 55 %
LVEF MODALITY: NORMAL
LYMPHOCYTES ABSOLUTE: 0.8 K/UL (ref 1–5.1)
LYMPHOCYTES RELATIVE PERCENT: 7 %
MCH RBC QN AUTO: 29.7 PG (ref 26–34)
MCHC RBC AUTO-ENTMCNC: 33.5 G/DL (ref 31–36)
MCV RBC AUTO: 88.5 FL (ref 80–100)
MONOCYTES ABSOLUTE: 0.2 K/UL (ref 0–1.3)
MONOCYTES RELATIVE PERCENT: 2.1 %
NEUTROPHILS ABSOLUTE: 9.9 K/UL (ref 1.7–7.7)
NEUTROPHILS RELATIVE PERCENT: 90.7 %
PDW BLD-RTO: 13.4 % (ref 12.4–15.4)
PHOSPHORUS: 4.7 MG/DL (ref 2.5–4.9)
PLATELET # BLD: 210 K/UL (ref 135–450)
PMV BLD AUTO: 9 FL (ref 5–10.5)
POTASSIUM SERPL-SCNC: 4.7 MMOL/L (ref 3.5–5.1)
PRO-BNP: 517 PG/ML (ref 0–124)
RBC # BLD: 4.42 M/UL (ref 4.2–5.9)
SODIUM BLD-SCNC: 132 MMOL/L (ref 136–145)
WBC # BLD: 10.9 K/UL (ref 4–11)

## 2021-03-08 PROCEDURE — 94640 AIRWAY INHALATION TREATMENT: CPT

## 2021-03-08 PROCEDURE — 85025 COMPLETE CBC W/AUTO DIFF WBC: CPT

## 2021-03-08 PROCEDURE — 83880 ASSAY OF NATRIURETIC PEPTIDE: CPT

## 2021-03-08 PROCEDURE — 2580000003 HC RX 258: Performed by: INTERNAL MEDICINE

## 2021-03-08 PROCEDURE — 6370000000 HC RX 637 (ALT 250 FOR IP): Performed by: INTERNAL MEDICINE

## 2021-03-08 PROCEDURE — 6360000002 HC RX W HCPCS: Performed by: INTERNAL MEDICINE

## 2021-03-08 PROCEDURE — C8929 TTE W OR WO FOL WCON,DOPPLER: HCPCS

## 2021-03-08 PROCEDURE — 94664 DEMO&/EVAL PT USE INHALER: CPT

## 2021-03-08 PROCEDURE — 80069 RENAL FUNCTION PANEL: CPT

## 2021-03-08 PROCEDURE — 6360000004 HC RX CONTRAST MEDICATION: Performed by: INTERNAL MEDICINE

## 2021-03-08 PROCEDURE — 83036 HEMOGLOBIN GLYCOSYLATED A1C: CPT

## 2021-03-08 PROCEDURE — 2060000000 HC ICU INTERMEDIATE R&B

## 2021-03-08 PROCEDURE — 36415 COLL VENOUS BLD VENIPUNCTURE: CPT

## 2021-03-08 PROCEDURE — 94799 UNLISTED PULMONARY SVC/PX: CPT

## 2021-03-08 RX ORDER — PREDNISONE 20 MG/1
40 TABLET ORAL DAILY
Status: DISCONTINUED | OUTPATIENT
Start: 2021-03-09 | End: 2021-03-10 | Stop reason: HOSPADM

## 2021-03-08 RX ADMIN — CETIRIZINE HYDROCHLORIDE 10 MG: 10 TABLET, FILM COATED ORAL at 08:14

## 2021-03-08 RX ADMIN — METOPROLOL TARTRATE 25 MG: 25 TABLET, FILM COATED ORAL at 08:14

## 2021-03-08 RX ADMIN — METHYLPREDNISOLONE SODIUM SUCCINATE 40 MG: 40 INJECTION, POWDER, FOR SOLUTION INTRAMUSCULAR; INTRAVENOUS at 05:00

## 2021-03-08 RX ADMIN — METHYLPREDNISOLONE SODIUM SUCCINATE 40 MG: 40 INJECTION, POWDER, FOR SOLUTION INTRAMUSCULAR; INTRAVENOUS at 08:15

## 2021-03-08 RX ADMIN — METOPROLOL TARTRATE 25 MG: 25 TABLET, FILM COATED ORAL at 21:19

## 2021-03-08 RX ADMIN — AZITHROMYCIN MONOHYDRATE 250 MG: 250 TABLET ORAL at 08:14

## 2021-03-08 RX ADMIN — ENOXAPARIN SODIUM 40 MG: 40 INJECTION SUBCUTANEOUS at 08:14

## 2021-03-08 RX ADMIN — IPRATROPIUM BROMIDE AND ALBUTEROL 1 PUFF: 20; 100 SPRAY, METERED RESPIRATORY (INHALATION) at 09:07

## 2021-03-08 RX ADMIN — Medication 6 MG: at 21:21

## 2021-03-08 RX ADMIN — Medication 10 ML: at 08:15

## 2021-03-08 RX ADMIN — ATORVASTATIN CALCIUM 20 MG: 20 TABLET, FILM COATED ORAL at 21:19

## 2021-03-08 RX ADMIN — IPRATROPIUM BROMIDE AND ALBUTEROL 1 PUFF: 20; 100 SPRAY, METERED RESPIRATORY (INHALATION) at 15:36

## 2021-03-08 RX ADMIN — AMLODIPINE BESYLATE 5 MG: 5 TABLET ORAL at 08:14

## 2021-03-08 RX ADMIN — IPRATROPIUM BROMIDE AND ALBUTEROL 1 PUFF: 20; 100 SPRAY, METERED RESPIRATORY (INHALATION) at 01:07

## 2021-03-08 RX ADMIN — PERFLUTREN 1.65 MG: 6.52 INJECTION, SUSPENSION INTRAVENOUS at 12:38

## 2021-03-08 RX ADMIN — Medication 10 ML: at 21:20

## 2021-03-08 ASSESSMENT — PAIN SCALES - GENERAL: PAINLEVEL_OUTOF10: 0

## 2021-03-08 NOTE — PLAN OF CARE
Problem: OXYGENATION/RESPIRATORY FUNCTION  Goal: Patient will achieve/maintain normal respiratory rate/effort  Description: Respiratory rate and effort will be within normal limits for the patient  Outcome: Ongoing   Pt remains on RA and SpO2 has been WNL during shift. Problem: HEMODYNAMIC STATUS  Goal: Patient has stable vital signs and fluid balance  Outcome: Ongoing   Pt's vital signs have been stable during shift. Pt's home BP meds are being held by physician r/t pt being hypotensive during day shift. Problem: FLUID AND ELECTROLYTE IMBALANCE  Goal: Fluid and electrolyte balance are achieved/maintained  Outcome: Ongoing   Pt received IV lasix to diureses pt, pt's I/O are being monitored and documented in Flowsheets. Problem: ACTIVITY INTOLERANCE/IMPAIRED MOBILITY  Goal: Mobility/activity is maintained at optimum level for patient  Outcome: Ongoing   Pt is up ad ben and ambulates independently. Pt is alert and oriented x4 and follows commands appropriately.

## 2021-03-08 NOTE — CARE COORDINATION
Case Management Note    Patient unavailable at this time for assessment      CM attempted to meet with patient for completion of initial face to face assessment at this time. Consult noted for assistance with discharge planning. Patient currently off the unit for ECHO. CM has reviewed chart and noted patient from home with family, not currently active with [facility] or noted DME. Will meet with patient for complete face to face assessment when available. CM will follow up when patient available and will complete initial assessment and assist with needs for discharge.     Pablito Altamirano RN  [facility]  Case Management Department  Ph: [phone]  Fax: [phone]

## 2021-03-08 NOTE — PROGRESS NOTES
Hospitalist Progress Note      PCP: Baylee Tompkins MD    Date of Admission: 3/6/2021    Chief Complaint: shortness of breath    Hospital Course:   72 y.o. male [address] with history of COPD, hypertension, hyperlipidemia, has had shortness of breath, suspect due to heart failure and COPD    Subjective:   Feels much bett his breathing is improving, concerned that his legs are swollen  Denies chest pain, able to tolerate p.o. intake      Medications:  Reviewed    Infusion Medications   Scheduled Medications    sodium chloride flush  10 mL Intravenous 2 times per day    enoxaparin  40 mg Subcutaneous Daily    [START ON 3/9/2021] predniSONE  40 mg Oral Daily    azithromycin  250 mg Oral Daily    amLODIPine  5 mg Oral Daily    atorvastatin  20 mg Oral Nightly    cetirizine  10 mg Oral Daily    [Held by provider] lisinopril-hydroCHLOROthiazide  1 tablet Oral Daily    albuterol-ipratropium  1 puff Inhalation 6 times per day    metoprolol tartrate  25 mg Oral BID    [Held by provider] lisinopril  5 mg Oral Daily     PRN Meds: sodium chloride flush, promethazine **OR** ondansetron, polyethylene glycol, acetaminophen **OR** acetaminophen, perflutren lipid microspheres, melatonin      Intake/Output Summary (Last 24 hours) at 3/8/2021 1049  Last data filed at 3/8/2021 0650  Gross per 24 hour   Intake 900 ml   Output 1800 ml   Net -900 ml       Physical Exam Performed:    /80   Pulse 79   Temp 97.8 °F (36.6 °C) (Oral)   Resp 22   Ht 6' (1.829 m)   Wt 243 lb 9.6 oz (110.5 kg)   SpO2 93%   BMI 33.04 kg/m²     General appearance: No apparent distress, appears stated age and cooperative. HEENT: Pupils equal, round, and reactive to light. Conjunctivae/corneas clear. Neck: Supple, with full range of motion. No jugular venous distention. Trachea midline. Respiratory:  Normal respiratory effort.   Diffuse bilateral wheezing present but better air entry compared to admission exam  Cardiovascular: Regular rate and rhythm with normal S1/S2 without murmurs, rubs or gallops. Abdomen: Soft, non-tender, non-distended with normal bowel sounds. Musculoskeletal: No clubbing, cyanosis or edema bilaterally. Full range of motion without deformity. Skin: Skin color, texture, turgor normal.  No rashes or lesions. Neurologic:  Neurovascularly intact without any focal sensory/motor deficits. Cranial nerves: II-XII intact, grossly non-focal.  Psychiatric: Alert and oriented, thought content appropriate, normal insight  Capillary Refill: Brisk,< 3 seconds   Peripheral Pulses: +2 palpable, equal bilaterally       Labs:   Recent Labs     03/06/21  0849 03/07/21  0353 03/08/21  0500   WBC 7.8 6.2 10.9   HGB 13.2* 12.4* 13.1*   HCT 40.5 37.7* 39.1*    201 210     Recent Labs     03/06/21  0849 03/07/21  0853 03/08/21  0500   * 132* 132*   K 4.7 4.8 4.7   CL 99 96* 96*   CO2 23 21 21   BUN 19 30* 48*   CREATININE 1.2 1.6* 1.6*   CALCIUM 9.4 9.0 9.1   PHOS  --  4.1 4.7     Recent Labs     03/06/21  0849   AST 27   ALT 24   BILITOT 0.5   ALKPHOS 149*     No results for input(s): INR in the last 72 hours. Recent Labs     03/06/21  0849   TROPONINI <0.01       Urinalysis:    No results found for: [place], BACTERIA, RBCUA, BLOODU, SPECGRAV, GLUCOSEU    Radiology:  XR CHEST PORTABLE   Final Result      1. Some prominent interstitial markings noted medial lung bases and right suprahilar   2. Hyperinflated lungs without segmental opacity                       Assessment/Plan:    Active Hospital Problems    Diagnosis Date Noted    COPD with acute exacerbation ([facility] [address] [J44.1] 03/06/2021     1. Dyspnea on exertion  2. Acute respiratory failure, increased work of breathing, tachypneic, use of accessory muscle respiration SPECT likely due to COPD with acute exacerbation, but lower extremity edema and dyspnea exertion also suspect underlying heart failure possibly right heart failure  3.  Acute kidney injury not present on admission, due to diuresis  4. Hypertension  5. Hyperlipidemia  6. Former smoker, quit 12 years ago     PLAN:  Clinically appears to be both COPD as well as heart failure  Can transition to oral prednisone from tomorrow morning  Continue Z-Cheng  After 40 mg IV Lasix his creatinine to 1.6 creatinine is stable at 1.6  He continues to have bilateral lower extremity swelling  Echo is ordered and pending at this time  Check bilateral lower extremity duplex  For now I will keep holding lisinopril/further diuretics  Renal profile daily  Strict input and output  Daily weights  Monitor on tele    DVT Prophylaxis: Lovenox  Diet: DIET LOW SODIUM 2 GM;  Code Status: Full Code    PT/OT Eval Status: N/A    Dispo - Continue inpatient care, possible discharge 1 to 2 days.   Echo pending, awaiting improvement of renal function    Camilla Au MD   Hospitalist

## 2021-03-08 NOTE — PROGRESS NOTES
RESPIRATORY THERAPY ASSESSMENT    Name:  Chela Abel Record Number:  [id number]  Age: 72 y.o. Gender: male  : 1950  Today's Date:  3/8/2021  Room:  [address] [place] [address]    Assessment     Is the patient being admitted for a COPD or Asthma exacerbation? No   (If yes the patient will be seen every 4 hours for the first 24 hours and then reassessed)    Patient Admission Diagnosis      Allergies  No Known Allergies    Minimum Predicted Vital Capacity:     1122          Actual Vital Capacity:      1500             Pulmonary History: ex smoker  Home Oxygen Therapy:  none  Home Respiratory Therapy: Albuterol MDI and HHN Q4H PRN   Current Respiratory Therapy:  Albuterol/atrovent Q4H  Treatment Type: MDI  Medications: Albuterol/Ipratropium    Respiratory Severity Index(RSI)   Patients with orders for inhalation medications, oxygen, or any therapeutic treatment modality will be placed on Respiratory Protocol. They will be assessed with the first treatment and at least every 72 hours thereafter. The following severity scale will be used to determine frequency of treatment intervention. Smoking History: Pulmonary Disease or Smoking History, Greater than 15 pack year = 2    Social History  Social History     Tobacco Use    Smoking status: Never Smoker    Smokeless tobacco: Never Used   Substance Use Topics    Alcohol use: Not Currently    Drug use: Never       Recent Surgical History: None = 0  Past Surgical History  History reviewed. No pertinent surgical history.     Level of Consciousness: Alert, Oriented, and Cooperative = 0    Level of Activity: Walking unassisted = 0    Respiratory Pattern: Regular Pattern; RR 8-20 = 0    Breath Sounds: Diminished unilaterally = 1    Sputum   ,  , Sputum How Obtained: Spontaneous cough  Cough: Strong, spontaneous, non-productive = 0    Vital Signs   BP (!) 114/58   Pulse 81   Temp 97.2 °F (36.2 °C) (Oral)   Resp 20   Ht 6' (1.829 m)   Wt 243 lb 9.6 oz (110.5 kg) SpO2 93%   BMI 33.04 kg/m²   SPO2 (COPD values may differ): Greater than or equal to 92% on room air = 0    Peak Flow (asthma only): not applicable = 0    RSI: 5-6 = Q4hr PRN (every four hours as needed) for dyspnea        Plan       Goals: medication delivery, mobilize retained secretions, volume expansion and improve oxygenation    Patient/caregiver was educated on the proper method of use for Respiratory Care Devices:  Yes      Level of patient/caregiver understanding able to:   ? Verbalize understanding   ? Demonstrate understanding       ? Teach back        ? Needs reinforcement       ? No available caregiver               ? Other:     Response to education:  Very Good     Is patient being placed on Home Treatment Regimen? No     Does the patient have everything they need prior to discharge? NA     Comments: Chart reviewed. Patient requesting txs TID and Q4H PRN. Plan of Care: Continue Albuterol/atrovent TID and Q4H PRN    Electronically signed by Davie Hawkins RCP on 3/8/2021 at 5:14 PM    Respiratory Protocol Guidelines     1. Assessment and treatment by Respiratory Therapy will be initiated for medication and therapeutic interventions upon initiation of aerosolized medication. 2. Physician will be contacted for respiratory rate (RR) greater than 35 breaths per minute. Therapy will be held for heart rate (HR) greater than 140 beats per minute, pending direction from physician. 3. Bronchodilators will be administered via Metered Dose Inhaler (MDI) with spacer when the following criteria are met:  a. Alert and cooperative     b. HR < 140 bpm  c. RR < 30 bpm                d. Can demonstrate a 2-3 second inspiratory hold  4. Bronchodilators will be administered via Hand Held Nebulizer SHANTEL [facility]) to patients when ANY of the following criteria are met  a. Incognizant or uncooperative          b. Patients treated with HHN at Home        c.  Unable to demonstrate proper use of MDI with spacer     d. RR >

## 2021-03-09 ENCOUNTER — APPOINTMENT (OUTPATIENT)
Dept: VASCULAR LAB | Age: 71
DRG: 190 | End: 2021-03-09
Payer: MEDICARE

## 2021-03-09 LAB
ANION GAP SERPL CALCULATED.3IONS-SCNC: 13 MMOL/L (ref 3–16)
BASOPHILS ABSOLUTE: 0 K/UL (ref 0–0.2)
BASOPHILS RELATIVE PERCENT: 0.2 %
BUN BLDV-MCNC: 51 MG/DL (ref 7–20)
CALCIUM SERPL-MCNC: 8.5 MG/DL (ref 8.3–10.6)
CHLORIDE BLD-SCNC: 96 MMOL/L (ref 99–110)
CO2: 22 MMOL/L (ref 21–32)
CREAT SERPL-MCNC: 1.6 MG/DL (ref 0.8–1.3)
EOSINOPHILS ABSOLUTE: 0 K/UL (ref 0–0.6)
EOSINOPHILS RELATIVE PERCENT: 0 %
ESTIMATED AVERAGE GLUCOSE: 125.5 MG/DL
GFR AFRICAN AMERICAN: 52
GFR NON-AFRICAN AMERICAN: 43
GLUCOSE BLD-MCNC: 131 MG/DL (ref 70–99)
HBA1C MFR BLD: 6 %
HCT VFR BLD CALC: 40.3 % (ref 40.5–52.5)
HEMOGLOBIN: 13.4 G/DL (ref 13.5–17.5)
LYMPHOCYTES ABSOLUTE: 1.3 K/UL (ref 1–5.1)
LYMPHOCYTES RELATIVE PERCENT: 11.7 %
MCH RBC QN AUTO: 29.4 PG (ref 26–34)
MCHC RBC AUTO-ENTMCNC: 33.2 G/DL (ref 31–36)
MCV RBC AUTO: 88.3 FL (ref 80–100)
MONOCYTES ABSOLUTE: 0.8 K/UL (ref 0–1.3)
MONOCYTES RELATIVE PERCENT: 7.2 %
NEUTROPHILS ABSOLUTE: 9 K/UL (ref 1.7–7.7)
NEUTROPHILS RELATIVE PERCENT: 80.9 %
PDW BLD-RTO: 13.8 % (ref 12.4–15.4)
PLATELET # BLD: 216 K/UL (ref 135–450)
PMV BLD AUTO: 8.9 FL (ref 5–10.5)
POTASSIUM REFLEX MAGNESIUM: 3.8 MMOL/L (ref 3.5–5.1)
PROTEIN PROTEIN: <4 MG/DL
RBC # BLD: 4.57 M/UL (ref 4.2–5.9)
SODIUM BLD-SCNC: 131 MMOL/L (ref 136–145)
SODIUM URINE: 30 MMOL/L
WBC # BLD: 11.1 K/UL (ref 4–11)

## 2021-03-09 PROCEDURE — 93970 EXTREMITY STUDY: CPT

## 2021-03-09 PROCEDURE — 84156 ASSAY OF PROTEIN URINE: CPT

## 2021-03-09 PROCEDURE — 6370000000 HC RX 637 (ALT 250 FOR IP): Performed by: INTERNAL MEDICINE

## 2021-03-09 PROCEDURE — 2580000003 HC RX 258: Performed by: INTERNAL MEDICINE

## 2021-03-09 PROCEDURE — 94640 AIRWAY INHALATION TREATMENT: CPT

## 2021-03-09 PROCEDURE — 84300 ASSAY OF URINE SODIUM: CPT

## 2021-03-09 PROCEDURE — 1200000000 HC SEMI PRIVATE

## 2021-03-09 PROCEDURE — 82570 ASSAY OF URINE CREATININE: CPT

## 2021-03-09 PROCEDURE — 85025 COMPLETE CBC W/AUTO DIFF WBC: CPT

## 2021-03-09 PROCEDURE — 80048 BASIC METABOLIC PNL TOTAL CA: CPT

## 2021-03-09 RX ORDER — PANTOPRAZOLE SODIUM 40 MG/1
40 TABLET, DELAYED RELEASE ORAL
Status: DISCONTINUED | OUTPATIENT
Start: 2021-03-10 | End: 2021-03-10 | Stop reason: HOSPADM

## 2021-03-09 RX ORDER — 0.9 % SODIUM CHLORIDE 0.9 %
500 INTRAVENOUS SOLUTION INTRAVENOUS ONCE
Status: COMPLETED | OUTPATIENT
Start: 2021-03-09 | End: 2021-03-09

## 2021-03-09 RX ORDER — CALCIUM CARBONATE 200(500)MG
500 TABLET,CHEWABLE ORAL ONCE
Status: COMPLETED | OUTPATIENT
Start: 2021-03-09 | End: 2021-03-09

## 2021-03-09 RX ADMIN — METOPROLOL TARTRATE 25 MG: 25 TABLET, FILM COATED ORAL at 07:34

## 2021-03-09 RX ADMIN — CETIRIZINE HYDROCHLORIDE 10 MG: 10 TABLET, FILM COATED ORAL at 07:34

## 2021-03-09 RX ADMIN — Medication 10 ML: at 21:13

## 2021-03-09 RX ADMIN — METOPROLOL TARTRATE 25 MG: 25 TABLET, FILM COATED ORAL at 21:12

## 2021-03-09 RX ADMIN — Medication 10 ML: at 07:34

## 2021-03-09 RX ADMIN — ATORVASTATIN CALCIUM 20 MG: 20 TABLET, FILM COATED ORAL at 21:12

## 2021-03-09 RX ADMIN — AZITHROMYCIN MONOHYDRATE 250 MG: 250 TABLET ORAL at 07:34

## 2021-03-09 RX ADMIN — SODIUM CHLORIDE 500 ML: 9 INJECTION, SOLUTION INTRAVENOUS at 14:00

## 2021-03-09 RX ADMIN — PREDNISONE 40 MG: 20 TABLET ORAL at 07:33

## 2021-03-09 RX ADMIN — AMLODIPINE BESYLATE 5 MG: 5 TABLET ORAL at 07:34

## 2021-03-09 RX ADMIN — ANTACID TABLETS 500 MG: 500 TABLET, CHEWABLE ORAL at 13:59

## 2021-03-09 ASSESSMENT — PAIN SCALES - GENERAL
PAINLEVEL_OUTOF10: 0

## 2021-03-09 NOTE — PLAN OF CARE
Problem: OXYGENATION/RESPIRATORY FUNCTION  Goal: Patient will maintain patent airway  3/9/2021 0635 by [facility]  Outcome: Ongoing   Pt remains on RA and SpO2 has been WNL during this shift. Problem: FLUID AND ELECTROLYTE IMBALANCE  Goal: Fluid and electrolyte balance are achieved/maintained  3/9/2021 0635 by [facility]  Outcome: Ongoing   Pt's intake/output is being documented in Flowsheets. Problem: ACTIVITY INTOLERANCE/IMPAIRED MOBILITY  Goal: Mobility/activity is maintained at optimum level for patient  3/9/2021 0976 by [facility]  Outcome: Ongoing   Pt is up ad ben and ambulates independently. Problem: Activity Intolerance:  Goal: Ability to tolerate increased activity will improve  Description: Ability to tolerate increased activity will improve  Outcome: Ongoing   Pt is up ad ben and ambulates independently. Problem: Gas Exchange - Impaired:  Goal: Levels of oxygenation will improve  Description: Levels of oxygenation will improve  Outcome: Ongoing   Pt remains on RA and SpO2 has been WNL during this shift.

## 2021-03-09 NOTE — PLAN OF CARE
Problem: Airway Clearance - Ineffective  Goal: Achieve or maintain patent airway  Outcome: Ongoing     Problem: Gas Exchange - Impaired  Goal: Absence of hypoxia  Outcome: Ongoing  Goal: Promote optimal lung function  Outcome: Ongoing     Problem:  Body Temperature -  Risk of, Imbalanced  Goal: Ability to maintain a body temperature within defined limits  Outcome: Ongoing  Goal: Will regain or maintain usual level of consciousness  Outcome: Ongoing  Goal: Complications related to the disease process, condition or treatment will be avoided or minimized  Outcome: Ongoing     Problem: Isolation Precautions - Risk of Spread of Infection  Goal: Prevent transmission of infection  Outcome: Ongoing     Problem: Risk for Fluid Volume Deficit  Goal: Maintain normal heart rhythm  Outcome: Ongoing  Goal: Maintain absence of muscle cramping  Outcome: Ongoing  Goal: Maintain normal serum potassium, sodium, calcium, phosphorus, and pH  Outcome: Ongoing     Problem: Fatigue  Goal: Verbalize increase energy and improved vitality  Outcome: Ongoing     Problem: Patient Education: Go to Patient Education Activity  Goal: Patient/Family Education  Outcome: Ongoing     Problem: Discharge Planning:  Goal: Discharged to appropriate level of care  Description: Discharged to appropriate level of care  Outcome: Ongoing     Problem: OXYGENATION/RESPIRATORY FUNCTION  Goal: Patient will maintain patent airway  Outcome: Ongoing  Goal: Patient will achieve/maintain normal respiratory rate/effort  Description: Respiratory rate and effort will be within normal limits for the patient  Outcome: Ongoing     Problem: HEMODYNAMIC STATUS  Goal: Patient has stable vital signs and fluid balance  Outcome: Ongoing     Problem: FLUID AND ELECTROLYTE IMBALANCE  Goal: Fluid and electrolyte balance are achieved/maintained  Outcome: Ongoing     Problem: ACTIVITY INTOLERANCE/IMPAIRED MOBILITY  Goal: Mobility/activity is maintained at optimum level for patient  Outcome: Ongoing   Patient is alert and oriented. Vitals stable throughout shift. Patient up as tolerated with a steady gait. Remains free of falls. Possible discharge tomorrow. Will continue to monitor.

## 2021-03-09 NOTE — CARE COORDINATION
Case Management Assessment           Initial Evaluation                Date / Time of Evaluation: 3/9/2021 9:27 AM                 Assessment Completed by: Faisal Huerta    Patient Name: Jovani Phillips     YOB: 1950  Diagnosis: COPD with acute exacerbation [facility]) [J44.1]     Date / Time: 3/6/2021  8:21 AM    Patient Admission Status: Inpatient    If patient is discharged prior to next notation, then this note serves as note for discharge by case management. Current PCP: Ermias Henry MD  Clinic Patient: No    Chart Reviewed: Yes  Patient/ Family Interviewed: Yes    Initial assessment completed at bedside with: patient over the phone    Hospitalization in the last 30 days: No    Emergency Contacts:  Extended Emergency Contact Information  Primary Emergency Contact: [address] [place] [address] Phone: [phone]  Relation: Child  Secondary Emergency Contact: Carlin Morales  [place] Phone: [phone]  Relation: Child    Advance Directives:   Code Status: Full [address]: No      Financial  Payor: Haris Hopkins / Plan: Christian Becerra ESSENTIAL/PLUS / Product Type: *No Product type* /     Pre-cert required for SNF: Yes    Pharmacy    [place] [id number]  Phone: [phone] Fax: [phone]      Potential assistance Purchasing Medications: Potential Assistance Purchasing Medications: Yes  Does Patient want to participate in local refill/ meds to beds program?: Yes    Meds To Beds General Rules:  1. Can ONLY be done Monday- Friday between 8:30am-5pm  2. Prescription(s) must be in pharmacy by 3pm to be filled same day  3. Copy of patient's insurance/ prescription drug card and patient face sheet must be sent along with the prescription(s)  4. Cost of Rx cannot be added to hospital bill.  If financial assistance is needed, please contact unit  or social worker;  or  CANNOT provide pharmacy voucher for patients co-pays  5. Patients can then  the prescription on their way out of the hospital at discharge, or pharmacy can deliver to the bedside if staff is available. (payment due at time of pick-up or delivery - cash, check, or card accepted)     Able to afford home medications/ co-pay costs: Yes    ADLS  Support Systems: Spouse/Significant Other    PT AM-PAC:   /24  OT AM-PAC:   /24    [place]: from home alone  Steps: 1 to melvi    Plans to RETURN to current housing: Yes  Barriers to RETURNING to current housing: medical complications    Home Care Information  Currently ACTIVE with [address]: No  Home Care Agency: Not Applicable    Currently ACTIVE with [place] [facility]: No    DISCHARGE PLAN:  Disposition: Home- No Services Needed    Transportation PLAN for discharge: family     Factors facilitating achievement of predicted outcomes: Family support, Cooperative and Pleasant    Barriers to discharge: Medical complications and Medication managment    Additional Case Management Notes: Pt from home alone, no DME, no HHC services. Grandson to transport at discharge. No CM needs noted. The Plan for Transition of Care is related to the following treatment goals of COPD with acute exacerbation ([facility] [J44.1]    The Patient and/or patient representative Raymon Salas and his family were provided with a choice of provider and agrees with the discharge plan Yes    Freedom of choice list was provided with basic dialogue that supports the patient's individualized plan of care/goals and shares the quality data associated with the providers.  Yes    Care Transition patient: No    Dann Pryor RN  [facility]  Case Management Department  Ph: [phone]   Fax: [phone]

## 2021-03-09 NOTE — CONSULTS
Nephrology Consult Note                                                                                                                                                                                                                                                                                                                                                               Office : [phone]     Fax :[phone]          Patient's Name: Sarita Mccarty  11:09 AM  3/9/2021    Reason for Consult: TALYA  Requesting Physician:  Cha Alejo MD      Chief Complaint:  Dyspnea     History of Present Ilness:    Sarita Mccarty is a 72 y.o. male with prior history of COPD (on CPAP at night). He presented with exertional dyspnea and leg swelling x 2 weeks that have been progressively worsening. On presentation, the patient reportedly appeared to be in respiratory distress with tachypnea and labored breathing. Workup in the ED was not concerning for cardiac ischemia. He was admitted for treatment of COPD and exacerbation of possible HF. Patient was diuresed during admission with 40mg IV lasix and corresponding creatinine bump from 1.2 to 1.6 and hypotension. Diuretics were held at this time. Creatinine remains elevated but is now stable. Past Medical History:   Diagnosis Date    COPD (chronic obstructive pulmonary disease) ([facility]     Hypertension     Obesity        History reviewed. No pertinent surgical history. History reviewed. No pertinent family history. reports that he has never smoked. He has never used smokeless tobacco. He reports previous alcohol use. He reports that he does not use drugs. Allergies:  Patient has no known allergies.     Current Medications:    predniSONE (DELTASONE) tablet 40 mg, Daily  albuterol-ipratropium (COMBIVENT RESPIMAT)  MCG/ACT inhaler 1 puff, TID  sodium chloride flush 0.9 % injection 10 mL, 2 times per day  sodium chloride flush 0.9 % injection 10 mL, PRN  promethazine (PHENERGAN) tablet 12.5 mg, Q6H PRN    Or  ondansetron (ZOFRAN) injection 4 mg, Q6H PRN  polyethylene glycol (GLYCOLAX) packet 17 g, Daily PRN  enoxaparin (LOVENOX) injection 40 mg, Daily  acetaminophen (TYLENOL) tablet 650 mg, Q6H PRN    Or  acetaminophen (TYLENOL) suppository 650 mg, Q6H PRN  azithromycin (ZITHROMAX) tablet 250 mg, Daily  amLODIPine (NORVASC) tablet 5 mg, Daily  atorvastatin (LIPITOR) tablet 20 mg, Nightly  cetirizine (ZYRTEC) tablet 10 mg, Daily  [Held by provider] lisinopril-hydroCHLOROthiazide (PRINZIDE;ZESTORETIC) 20-12.5 MG per tablet 1 tablet, Daily  melatonin tablet 6 mg, Nightly PRN  metoprolol tartrate (LOPRESSOR) tablet 25 mg, BID  [Held by provider] lisinopril (PRINIVIL;ZESTRIL) tablet 5 mg, Daily        Review of Systems:   14 point ROS obtained but were negative except mentioned in HPI      Physical exam:     Vitals:  /70   Pulse 66   Temp 98.7 °F (37.1 °C) (Oral)   Resp 18   Ht 6' (1.829 m)   Wt 245 lb 9.5 oz (111.4 kg)   SpO2 93%   BMI 33.31 kg/m²      Unable to perform physical exam due to patient mid-procedure at vascular this AM    Data:   Labs:  CBC:   Recent Labs     03/07/21  0353 03/08/21  0500 03/09/21  0501   WBC 6.2 10.9 11.1*   HGB 12.4* 13.1* 13.4*    210 216     BMP:    Recent Labs     03/07/21  0853 03/08/21  0500 03/09/21  0501   * 132* 131*   K 4.8 4.7 3.8   CL 96* 96* 96*   CO2 21 21 22   BUN 30* 48* 51*   CREATININE 1.6* 1.6* 1.6*   GLUCOSE 144* 144* 131*     Ca/Mg/Phos:   Recent Labs     03/07/21  0853 03/08/21  0500 03/09/21  0501   CALCIUM 9.0 9.1 8.5   PHOS 4.1 4.7  --      Hepatic: No results for input(s): AST, ALT, ALB, BILITOT, ALKPHOS in the last 72 hours. Troponin: No results for input(s): TROPONINI in the last 72 hours. BNP: No results for input(s): BNP in the last 72 hours. Lipids: No results for input(s): CHOL, TRIG, HDL, LDLCALC, LABVLDL in the last 72 hours.   ABGs: No results for input(s): PHART, PO2ART, LKX5EDY in the last 72 hours. INR: No results for input(s): INR in the last 72 hours. UA:No results for input(s): Joseline Fletcher, GLUCOSEU, BILIRUBINUR, Rosaura Beckers, BLOODU, PHUR, PROTEINU, UROBILINOGEN, NITRU, LEUKOCYTESUR, LABMICR, URINETYPE in the last 72 hours. Urine Microscopic: No results for input(s): LABCAST, BACTERIA, COMU, HYALCAST, WBCUA, RBCUA, EPIU in the last 72 hours. Urine Culture: No results for input(s): LABURIN in the last 72 hours. Urine Chemistry:   Recent Labs     03/07/21  1305   LABCREA 92.3   PROTEINUR 7.00           IMAGING:  XR CHEST PORTABLE   Final Result      1. Some prominent interstitial markings noted medial lung bases and right suprahilar   2. Hyperinflated lungs without segmental opacity               VL Extremity Venous Bilateral    (Results Pending)       Assessment/Plan   1. TALYA  S/p 40 mg IV Lasix on 3/6 (one dose), good response but creatinine went up to 1.6 and now systolic blood pressures in the 90s. BUN increased today to 51. 1.98L U/O over last 24 hrs. Net  -1.365L since admission.  - hold his lisinopril/hold further diuretics  - Renal panel daily  - Strict input and output  - Daily weights  - holding nephrotoxic medications  - consider gentle IV fluids    2. HTN - controlled  Takes amlodipine 5mg daily and lisinopril-HCTZ 20-12.5mg daily at home.   - continue amlodipine 5mg daily  - continue lopressor 25 mg bid  - holding home lisinopril-HCTZ    3. Acid- base/ Electrolyte imbalance   - replace electrolytes as needed      Thank you for allowing us to participate in care of Kashmir Palomino MD  Internal Medicine PGY-1  3/9/2021    TALYA sec to vol changes   No signs of Nephrotic syndrome   Cr stable   Saline Bolus       Geetha Bran MD

## 2021-03-09 NOTE — PROGRESS NOTES
Pt arrives as a transfer from [address] [place] [address] negative for covid. Pt placed on tele; rate and rhythm verified by monitor reader. Pt is alert, oriented and pleasant. VSS. Assessment as documented. Pt denies needs. He is aware of call light usage and plan of care. Will continue to round on pt for safety/needs.

## 2021-03-09 NOTE — PROGRESS NOTES
gallops. Abdomen: Soft, non-tender, non-distended with normal bowel sounds. Musculoskeletal: No clubbing, cyanosis, bilateral 1 + edema  Full range of motion without deformity. Skin: Skin color, texture, turgor normal.  No rashes or lesions. Neurologic:  Neurovascularly intact without any focal sensory/motor deficits. Cranial nerves: II-XII intact, grossly non-focal.  Psychiatric: Alert and oriented, thought content appropriate, normal insight  Capillary Refill: Brisk,< 3 seconds   Peripheral Pulses: +2 palpable, equal bilaterally       Labs:   Recent Labs     03/07/21  0353 03/08/21  0500 03/09/21  0501   WBC 6.2 10.9 11.1*   HGB 12.4* 13.1* 13.4*   HCT 37.7* 39.1* 40.3*    210 216     Recent Labs     03/07/21  0853 03/08/21  0500 03/09/21  0501   * 132* 131*   K 4.8 4.7 3.8   CL 96* 96* 96*   CO2 21 21 22   BUN 30* 48* 51*   CREATININE 1.6* 1.6* 1.6*   CALCIUM 9.0 9.1 8.5   PHOS 4.1 4.7  --      No results for input(s): AST, ALT, BILIDIR, BILITOT, ALKPHOS in the last 72 hours. No results for input(s): INR in the last 72 hours. No results for input(s): Jered Girma in the last 72 hours. Urinalysis:    No results found for: Ankur Don, BACTERIA, RBCUA, BLOODU, [address]    Radiology:  VL Extremity Venous Bilateral         XR CHEST PORTABLE   Final Result      1. Some prominent interstitial markings noted medial lung bases and right suprahilar   2. Hyperinflated lungs without segmental opacity                   TTE 03/08/2021  Summary   Left ventricular cavity size is normal. There is mild concentric left   ventricular hypertrophy. Overall left ventricular systolic function appears   normal with an ejection fraction of 55%. No regional wall motion   abnormalities are noted. Normal diastolic function. The non coronary cusp is   thickened /calcified but the valve opens adequately.    Estimated pulmonary artery systolic pressure is at 24 mmHg assuming a right   atrial pressure of 3 mmHg.   The right ventricle is mildly enlarged. Systolic function is preserved. The right atrium is dilated. Assessment/Plan:    Active Hospital Problems    Diagnosis Date Noted    COPD with acute exacerbation ([facility] [J44.1] 03/06/2021     1. Dyspnea on exertion  2. Acute respiratory failure, increased work of breathing, tachypneic, use of accessory muscle respiration SPECT likely due to COPD with acute exacerbation, but lower extremity edema and dyspnea exertion also suspect underlying heart failure possibly right heart failure  3. Acute kidney injury not present on admission, due to diuresis  4. Hypertension  5. Hyperlipidemia  6.  Former smoker, quit 12 years ago     PLAN:  Clinically appears to be both COPD as well as heart failure  Continue prednisone  Continue Z-Cheng (total 5 days)  After 40 mg IV Lasix his creatinine to 1.6 creatinine is stable at 1.6  He continues to have bilateral lower extremity swelling, ruled out DVT with negative Duplex  Cr continues to be elevated, nephrology consulted  Renal profile daily  Strict input and output  Daily weights  Monitor on tele    DVT Prophylaxis: Lovenox  Diet: DIET LOW SODIUM 2 GM;  Code Status: Full Code    PT/OT Eval Status: N/A    Dispo - Continue inpatient care, nephrology consulted for evaluation, dc once cleared by nephrology    Monster Sears MD   Hospitalist

## 2021-03-10 VITALS
HEIGHT: 72 IN | DIASTOLIC BLOOD PRESSURE: 73 MMHG | HEART RATE: 66 BPM | TEMPERATURE: 97.1 F | BODY MASS INDEX: 33.18 KG/M2 | SYSTOLIC BLOOD PRESSURE: 110 MMHG | OXYGEN SATURATION: 94 % | RESPIRATION RATE: 20 BRPM | WEIGHT: 245 LBS

## 2021-03-10 PROBLEM — N17.9 AKI (ACUTE KIDNEY INJURY) (HCC): Status: ACTIVE | Noted: 2021-03-10

## 2021-03-10 PROBLEM — M79.89 LEG SWELLING: Status: ACTIVE | Noted: 2021-03-10

## 2021-03-10 LAB
ALBUMIN SERPL-MCNC: 4 G/DL (ref 3.4–5)
ANION GAP SERPL CALCULATED.3IONS-SCNC: 10 MMOL/L (ref 3–16)
BASOPHILS ABSOLUTE: 0 K/UL (ref 0–0.2)
BASOPHILS RELATIVE PERCENT: 0.1 %
BUN BLDV-MCNC: 47 MG/DL (ref 7–20)
CALCIUM SERPL-MCNC: 8.5 MG/DL (ref 8.3–10.6)
CHLORIDE BLD-SCNC: 104 MMOL/L (ref 99–110)
CO2: 23 MMOL/L (ref 21–32)
CREAT SERPL-MCNC: 1.5 MG/DL (ref 0.8–1.3)
CREATININE URINE: 51.2 MG/DL (ref 39–259)
EOSINOPHILS ABSOLUTE: 0 K/UL (ref 0–0.6)
EOSINOPHILS RELATIVE PERCENT: 0.4 %
GFR AFRICAN AMERICAN: 56
GFR NON-AFRICAN AMERICAN: 46
GLUCOSE BLD-MCNC: 82 MG/DL (ref 70–99)
HCT VFR BLD CALC: 38 % (ref 40.5–52.5)
HEMOGLOBIN: 12.5 G/DL (ref 13.5–17.5)
LYMPHOCYTES ABSOLUTE: 2.3 K/UL (ref 1–5.1)
LYMPHOCYTES RELATIVE PERCENT: 25.8 %
MCH RBC QN AUTO: 29.4 PG (ref 26–34)
MCHC RBC AUTO-ENTMCNC: 32.9 G/DL (ref 31–36)
MCV RBC AUTO: 89.3 FL (ref 80–100)
MONOCYTES ABSOLUTE: 0.9 K/UL (ref 0–1.3)
MONOCYTES RELATIVE PERCENT: 9.8 %
NEUTROPHILS ABSOLUTE: 5.7 K/UL (ref 1.7–7.7)
NEUTROPHILS RELATIVE PERCENT: 63.9 %
PDW BLD-RTO: 13.9 % (ref 12.4–15.4)
PHOSPHORUS: 3.1 MG/DL (ref 2.5–4.9)
PLATELET # BLD: 192 K/UL (ref 135–450)
PMV BLD AUTO: 9 FL (ref 5–10.5)
POTASSIUM SERPL-SCNC: 4 MMOL/L (ref 3.5–5.1)
RBC # BLD: 4.25 M/UL (ref 4.2–5.9)
SODIUM BLD-SCNC: 137 MMOL/L (ref 136–145)
WBC # BLD: 8.9 K/UL (ref 4–11)

## 2021-03-10 PROCEDURE — 80069 RENAL FUNCTION PANEL: CPT

## 2021-03-10 PROCEDURE — 36415 COLL VENOUS BLD VENIPUNCTURE: CPT

## 2021-03-10 PROCEDURE — 94640 AIRWAY INHALATION TREATMENT: CPT

## 2021-03-10 PROCEDURE — 94761 N-INVAS EAR/PLS OXIMETRY MLT: CPT

## 2021-03-10 PROCEDURE — 85025 COMPLETE CBC W/AUTO DIFF WBC: CPT

## 2021-03-10 PROCEDURE — 2580000003 HC RX 258: Performed by: INTERNAL MEDICINE

## 2021-03-10 PROCEDURE — 6370000000 HC RX 637 (ALT 250 FOR IP): Performed by: INTERNAL MEDICINE

## 2021-03-10 PROCEDURE — 6360000002 HC RX W HCPCS: Performed by: INTERNAL MEDICINE

## 2021-03-10 RX ORDER — PREDNISONE 20 MG/1
40 TABLET ORAL DAILY
Qty: 6 TABLET | Refills: 0 | Status: SHIPPED | OUTPATIENT
Start: 2021-03-11 | End: 2021-03-14

## 2021-03-10 RX ORDER — PANTOPRAZOLE SODIUM 40 MG/1
40 TABLET, DELAYED RELEASE ORAL
Qty: 30 TABLET | Refills: 0 | COMMUNITY
Start: 2021-03-11 | End: 2021-04-10

## 2021-03-10 RX ORDER — LISINOPRIL 5 MG/1
5 TABLET ORAL DAILY
Qty: 30 TABLET | Refills: 3 | Status: SHIPPED | OUTPATIENT
Start: 2021-03-10 | End: 2021-07-12 | Stop reason: SDUPTHER

## 2021-03-10 RX ADMIN — PANTOPRAZOLE SODIUM 40 MG: 40 TABLET, DELAYED RELEASE ORAL at 06:51

## 2021-03-10 RX ADMIN — AZITHROMYCIN MONOHYDRATE 250 MG: 250 TABLET ORAL at 08:23

## 2021-03-10 RX ADMIN — PREDNISONE 40 MG: 20 TABLET ORAL at 08:23

## 2021-03-10 RX ADMIN — ENOXAPARIN SODIUM 40 MG: 40 INJECTION SUBCUTANEOUS at 08:23

## 2021-03-10 RX ADMIN — CETIRIZINE HYDROCHLORIDE 10 MG: 10 TABLET, FILM COATED ORAL at 08:23

## 2021-03-10 RX ADMIN — Medication 10 ML: at 08:16

## 2021-03-10 NOTE — DISCHARGE SUMMARY
Hospital Medicine Discharge Summary    Patient ID: Cori Angela      Patient's PCP: Jesu Moreira MD    Admit Date: 3/6/2021     Discharge Date:   03/10/2021    Admitting Physician: Frances Lackey MD     Discharge Physician: Frances Lackey MD     Discharge Diagnoses:  1. Acute respiratory failure due to COPD with acute exacerbation  2. Leg swelling likely secondary to medication use/amlodipine  3. Acute kidney injury due to diuretics not present on admission    Active Hospital Problems    Diagnosis Date Noted    TALYA (acute kidney injury) ([facility] [N17.9] 03/10/2021    Leg swelling [M79.89] 03/10/2021    COPD with acute exacerbation ([facility] [J44.1] 03/06/2021       The patient was seen and examined on day of discharge and this discharge summary is in conjunction with any daily progress note from day of discharge. Hospital Course:   41-year-old male history of COPD hypertension hyperlipidemia presented to the ED for continued shortness of breath admitted further evaluation and management. Dyspnea, acute respiratory failure with increased work of breathing tachypnea is accessory muscle respiration, due to COPD with acute exacerbation. Treated with IV steroids and 48 hours and then transition to prednisone. Also treated with Z-Cheng. Initially with lower extremity edema there was suspicion for possibly heart failure, he was given a dose of IV Lasix and his creatinine jumped from 0.8 to 1.6. Further diuretics were stopped. Echo did not show evidence of heart failure. Nephrology is consulted, he was given gentle IV fluids. His creatinine did improve slightly and on the day of discharge was 1.5. Likely lower extremity edema is due to his use of amlodipine. This was stopped, and patient for less edema is starting to get better. For hypertension he was started on lisinopril 5 mg. He will follow up with nephrology in 2 weeks with a repeat renal panel.   He will continue prednisone 40 mg for another 3 days to complete 5-day course. Patient being discharged in stable condition. He is given a referral for pulmonary to establish care for COPD management. Physical Exam Performed:     /73   Pulse 66   Temp 97.1 °F (36.2 °C) (Oral)   Resp 20   Ht 6' (1.829 m)   Wt 245 lb (111.1 kg)   SpO2 94%   BMI 33.23 kg/m²       General appearance:  No apparent distress, appears stated age and cooperative. HEENT:  Normal cephalic, atraumatic without obvious deformity. Pupils equal, round, and reactive to light. Extra ocular muscles intact. Conjunctivae/corneas clear. Neck: Supple, with full range of motion. No jugular venous distention. Trachea midline. Respiratory:  Normal respiratory effort. Clear to auscultation, bilaterally without Rales/Wheezes/Rhonchi. Cardiovascular:  Regular rate and rhythm with normal S1/S2 without murmurs, rubs or gallops. Abdomen: Soft, non-tender, non-distended with normal bowel sounds. Musculoskeletal:  No clubbing, cyanosis, 1+ edema bilaterally. Full range of motion without deformity. Skin: Skin color, texture, turgor normal.  No rashes or lesions. Neurologic:  Neurovascularly intact without any focal sensory/motor deficits. Cranial nerves: II-XII intact, grossly non-focal.  Psychiatric:  Alert and oriented, thought content appropriate, normal insight  Capillary Refill: Brisk,< 3 seconds   Peripheral Pulses: +2 palpable, equal bilaterally       Labs:  For convenience and continuity at follow-up the following most recent labs are provided:      CBC:    Lab Results   Component Value Date    WBC 8.9 03/10/2021    HGB 12.5 03/10/2021    HCT 38.0 03/10/2021     03/10/2021       Renal:    Lab Results   Component Value Date     03/10/2021    K 4.0 03/10/2021    K 3.8 03/09/2021     03/10/2021    CO2 23 03/10/2021    BUN 47 03/10/2021    CREATININE 1.5 03/10/2021    CALCIUM 8.5 03/10/2021    PHOS 3.1 03/10/2021         Significant Diagnostic Studies    Radiology:   VL Extremity Venous Bilateral         XR CHEST PORTABLE   Final Result      1. Some prominent interstitial markings noted medial lung bases and right suprahilar   2.  Hyperinflated lungs without segmental opacity                      Consults:     IP CONSULT TO HOSPITALIST  IP CONSULT TO PHARMACY  IP CONSULT TO HEART FAILURE NURSE/COORDINATOR  IP CONSULT TO NEPHROLOGY    Disposition: Home    Condition at Discharge: Stable    Discharge Instructions/Follow-up:    Establish care with pulmonary  Continue prednisone course of 5 days  Stop taking amlodipine  New medication metoprolol lisinopril to be started tomorrow  Follow-up with nephrology in 2 weeks with repeat renal panel    Code Status:  Full Code    Activity: activity as tolerated    Diet: regular diet      Discharge Medications:     Current Discharge Medication List           Details   metoprolol tartrate (LOPRESSOR) 25 MG tablet Take 1 tablet by mouth 2 times daily  Qty: 60 tablet, Refills: 3      pantoprazole (PROTONIX) 40 MG tablet Take 1 tablet by mouth every morning (before breakfast)  Qty: 30 tablet, Refills: 0      predniSONE (DELTASONE) 20 MG tablet Take 2 tablets by mouth daily for 3 days  Qty: 6 tablet, Refills: 0      lisinopril (PRINIVIL;ZESTRIL) 5 MG tablet Take 1 tablet by mouth daily  Qty: 30 tablet, Refills: 3              Details   albuterol (PROVENTIL) (2.5 MG/3ML) 0.083% nebulizer solution Take by nebulization every 4-6 hours as needed for Shortness of Breath      albuterol sulfate  (90 Base) MCG/ACT inhaler Inhale 2 puffs into the lungs every 4 hours as needed for Shortness of Breath      atorvastatin (LIPITOR) 20 MG tablet Take 20 mg by mouth daily      diphenhydrAMINE (BENADRYL) 25 MG tablet Take 25 mg by mouth every other day      cetirizine (ZYRTEC) 10 MG tablet Take 10 mg by mouth daily             Time Spent on discharge is more than 30 minutes in the examination, evaluation, counseling and review of medications and discharge plan. Signed:    Olvin Higgins MD   3/10/2021      Thank you Claire Gallegos MD for the opportunity to be involved in this patient's care. If you have any questions or concerns please feel free to contact me at [phone].

## 2021-03-10 NOTE — PROGRESS NOTES
Order in for pt discharge home with follow up. IV and tele removed. New prescriptions, side effects, usage and dosage explained to pt and he verbalized understanding. Pt discharged home with all belongings via private vehicle.

## 2021-03-10 NOTE — PLAN OF CARE
Problem: OXYGENATION/RESPIRATORY FUNCTION  Goal: Patient will achieve/maintain normal respiratory rate/effort  Description: Respiratory rate and effort will be within normal limits for the patient  Outcome: Ongoing  Note: Resp easy at rest. Becomes slightly MCGUIRE with talking and ambulating in room. Lungs clear and diminished. SpO2 90-93% on RA. Will continue to monitor and plan of care. Problem: HEMODYNAMIC STATUS  Goal: Patient has stable vital signs and fluid balance  Outcome: Ongoing  Note: Sbp 104-107 so far tonight. Other VSS. NSR with prolonged QRS on tele. Noted 1-2+ pitting edema BLE. Voiding clear yellow urine. Will continue to monitor and plan of care.

## 2021-03-10 NOTE — CONSULTS
[id number] [address] FAILURE PROGRAM      Carlin Morales 1950    History: MD originally concerned for CHF. Echo relatively unremarkable. Pt has COPD hx but given leg swelling pt was given IV lasix. Discussed with Dr. Terri Steiner on day of discharge, no concern for CHF. No need for cardiology follow up as outpatient. Per Dr. Terri Steiner swelling likely related to Amlodipine, which was stopped at discharge and pt had COPD exacerbation. Pt educated on swelling and also COPD. Given information about pulmonary rehab and recommended discussing with PCP about following a pulmonologist as an outpatient. Past Medical History:   Diagnosis Date    COPD (chronic obstructive pulmonary disease) ([facility]     Hypertension     Obesity        ECHO: Summary 3/8/2021   Left ventricular cavity size is normal. There is mild concentric left   ventricular hypertrophy. Overall left ventricular systolic function appears   normal with an ejection fraction of 55%. No regional wall motion   abnormalities are noted. Normal diastolic function. The non coronary cusp is   thickened /calcified but the valve opens adequately. Estimated pulmonary artery systolic pressure is at 24 mmHg assuming a right   atrial pressure of 3 mmHg. The right ventricle is mildly enlarged. Systolic function is preserved. The right atrium is dilated. ACE/ARB: N/A  BB: N/A  Aldosterone Antagonist: N/A    History of sleep apnea: No      Last Hospital Admission: None in system  Code Status: Full  Discharge plans: Home with Son    Family Present: No      Discussed importance of lifestyle changes: increasing activity. PATIENT/CAREGIVER TEACHING:    Level of patient/caregiver understanding able to:   [ [place] Verbalize understanding [ ] Demonstrate understanding [ Jorge L Zuniga back   [ ] Needs reinforcement [ ] Other:       Time spent teachinmin    1. WEIGHT: Admit Weight: 240 lb (108.9 kg)      Today  Weight: 245 lb (111.1 kg)   2.  I/O     Intake/Output Summary (Last 24 hours) at 3/10/2021 1123  Last data filed at 3/10/2021 0854  Gross per 24 hour   Intake 1440 ml   Output 1650 ml   Net -210 ml       Recommendations:   See PCP and inquire about pulmonary rehab and pulmonology referral   Continue to educate on S/S of [address] 3/10/2021 11:23 AM

## 2021-03-11 NOTE — PROGRESS NOTES
TROPONINI in the last 72 hours. BNP: No results for input(s): BNP in the last 72 hours. Lipids: No results for input(s): CHOL, TRIG, HDL, LDLCALC, LABVLDL in the last 72 hours. ABGs: No results for input(s): PHART, PO2ART, KHV9HOM in the last 72 hours. INR: No results for input(s): INR in the last 72 hours. UA:No results for input(s): Annia Liliana, GLUCOSEU, BILIRUBINUR, Camille Baller, BLOODU, PHUR, PROTEINU, UROBILINOGEN, NITRU, LEUKOCYTESUR, LABMICR, URINETYPE in the last 72 hours. Urine Microscopic: No results for input(s): LABCAST, BACTERIA, COMU, HYALCAST, WBCUA, RBCUA, EPIU in the last 72 hours. Urine Culture: No results for input(s): LABURIN in the last 72 hours. Urine Chemistry:   Recent Labs     03/09/21  1240   LABCREA 51.2   PROTEINUR <4.00   NAUR 30           IMAGING:  VL Extremity Venous Bilateral         XR CHEST PORTABLE   Final Result      1. Some prominent interstitial markings noted medial lung bases and right suprahilar   2. Hyperinflated lungs without segmental opacity                   Assessment/Plan   1. TALYA  TALYA sec to diuresis   Hold lasix   Saline bolus x 1   No proteinuria     2. HTN - controlled  Stop CCB sec to edema   Cont lisinopril at discharge     3. Acid- base/ Electrolyte imbalance   - replace electrolytes as needed    4.  Edema   - stop CCB       Thank you for allowing us to participate in care of Britney Fan MD

## 2021-12-14 DIAGNOSIS — N17.9 AKI (ACUTE KIDNEY INJURY) (HCC): ICD-10-CM

## 2021-12-14 LAB
ALBUMIN SERPL-MCNC: 4.8 G/DL (ref 3.4–5)
ANION GAP SERPL CALCULATED.3IONS-SCNC: 13 MMOL/L (ref 3–16)
BUN BLDV-MCNC: 17 MG/DL (ref 7–20)
CALCIUM SERPL-MCNC: 9.2 MG/DL (ref 8.3–10.6)
CHLORIDE BLD-SCNC: 104 MMOL/L (ref 99–110)
CO2: 27 MMOL/L (ref 21–32)
CREAT SERPL-MCNC: 1.1 MG/DL (ref 0.8–1.3)
GFR AFRICAN AMERICAN: >60
GFR NON-AFRICAN AMERICAN: >60
GLUCOSE BLD-MCNC: 84 MG/DL (ref 70–99)
PHOSPHORUS: 3.6 MG/DL (ref 2.5–4.9)
POTASSIUM SERPL-SCNC: 4.8 MMOL/L (ref 3.5–5.1)
SODIUM BLD-SCNC: 144 MMOL/L (ref 136–145)

## 2025-08-17 ENCOUNTER — HOSPITAL ENCOUNTER (EMERGENCY)
Age: 75
Discharge: HOME | End: 2025-08-17
Attending: EMERGENCY MEDICINE
Payer: MEDICARE

## 2025-08-17 VITALS
HEIGHT: 73 IN | WEIGHT: 269.62 LBS | SYSTOLIC BLOOD PRESSURE: 148 MMHG | RESPIRATION RATE: 22 BRPM | DIASTOLIC BLOOD PRESSURE: 77 MMHG | HEART RATE: 86 BPM | BODY MASS INDEX: 35.73 KG/M2 | OXYGEN SATURATION: 99 % | TEMPERATURE: 97.6 F

## 2025-08-17 DIAGNOSIS — S05.02XA LEFT CORNEAL ABRASION, INITIAL ENCOUNTER: Primary | ICD-10-CM

## 2025-08-17 PROCEDURE — 99284 EMERGENCY DEPT VISIT MOD MDM: CPT

## 2025-08-17 PROCEDURE — 6370000000 HC RX 637 (ALT 250 FOR IP): Performed by: EMERGENCY MEDICINE

## 2025-08-17 RX ORDER — CIPROFLOXACIN HYDROCHLORIDE 3.5 MG/ML
1 SOLUTION/ DROPS TOPICAL
Qty: 1 EACH | Refills: 0 | Status: SHIPPED | OUTPATIENT
Start: 2025-08-17 | End: 2025-08-22

## 2025-08-17 RX ORDER — FLUORESCEIN SODIUM 1 MG/MG
1 STRIP OPHTHALMIC ONCE
Status: COMPLETED | OUTPATIENT
Start: 2025-08-17 | End: 2025-08-17

## 2025-08-17 RX ORDER — CIPROFLOXACIN HYDROCHLORIDE 3.5 MG/ML
1 SOLUTION/ DROPS TOPICAL
Status: DISCONTINUED | OUTPATIENT
Start: 2025-08-17 | End: 2025-08-17 | Stop reason: HOSPADM

## 2025-08-17 RX ORDER — TETRACAINE HYDROCHLORIDE 5 MG/ML
1 SOLUTION OPHTHALMIC ONCE
Status: COMPLETED | OUTPATIENT
Start: 2025-08-17 | End: 2025-08-17

## 2025-08-17 RX ADMIN — CIPROFLOXACIN 1 DROP: 3 SOLUTION OPHTHALMIC at 00:45

## 2025-08-17 RX ADMIN — TETRACAINE HYDROCHLORIDE 1 DROP: 5 SOLUTION OPHTHALMIC at 00:45

## 2025-08-17 RX ADMIN — FLUORESCEIN SODIUM 1 MG: 1 STRIP OPHTHALMIC at 00:52

## 2025-08-17 ASSESSMENT — PAIN DESCRIPTION - LOCATION: LOCATION: EYE

## 2025-08-17 ASSESSMENT — PAIN - FUNCTIONAL ASSESSMENT: PAIN_FUNCTIONAL_ASSESSMENT: 0-10

## 2025-08-17 ASSESSMENT — PAIN DESCRIPTION - DESCRIPTORS: DESCRIPTORS: DISCOMFORT;OTHER (COMMENT)

## 2025-08-17 ASSESSMENT — PAIN SCALES - GENERAL: PAINLEVEL_OUTOF10: 5

## 2025-08-17 ASSESSMENT — VISUAL ACUITY
OS: 20/50
OD: 20/30
OU: 20/30

## 2025-08-17 ASSESSMENT — PAIN DESCRIPTION - ORIENTATION: ORIENTATION: LEFT
